# Patient Record
Sex: FEMALE | Race: WHITE | Employment: OTHER | URBAN - METROPOLITAN AREA
[De-identification: names, ages, dates, MRNs, and addresses within clinical notes are randomized per-mention and may not be internally consistent; named-entity substitution may affect disease eponyms.]

---

## 2017-01-03 ENCOUNTER — LAB ENCOUNTER (OUTPATIENT)
Dept: LAB | Age: 72
End: 2017-01-03
Attending: INTERNAL MEDICINE

## 2017-01-03 DIAGNOSIS — N17.9 AKI (ACUTE KIDNEY INJURY) (HCC): ICD-10-CM

## 2017-01-03 DIAGNOSIS — E83.52 HYPERCALCEMIA: ICD-10-CM

## 2017-01-03 LAB
25-HYDROXYVITAMIN D (TOTAL): 29.5 NG/ML (ref 30–100)
BASOPHILS # BLD AUTO: 0.03 X10(3) UL (ref 0–0.1)
BASOPHILS NFR BLD AUTO: 0.4 %
BILIRUB UR QL STRIP.AUTO: NEGATIVE
BUN BLD-MCNC: 27 MG/DL (ref 8–20)
CALCIUM BLD-MCNC: 9.1 MG/DL (ref 8.3–10.3)
CHLORIDE: 103 MMOL/L (ref 101–111)
CLARITY UR REFRACT.AUTO: CLEAR
CO2: 28 MMOL/L (ref 22–32)
COLOR UR AUTO: COLORLESS
CREAT BLD-MCNC: 1.18 MG/DL (ref 0.55–1.02)
EOSINOPHIL # BLD AUTO: 0.18 X10(3) UL (ref 0–0.3)
EOSINOPHIL NFR BLD AUTO: 2.4 %
ERYTHROCYTE [DISTWIDTH] IN BLOOD BY AUTOMATED COUNT: 14.4 % (ref 11.5–16)
GLUCOSE BLD-MCNC: 109 MG/DL (ref 70–99)
GLUCOSE UR STRIP.AUTO-MCNC: NEGATIVE MG/DL
HAV IGM SER QL: 2.2 MG/DL (ref 1.7–3)
HCT VFR BLD AUTO: 44.1 % (ref 34–50)
HGB BLD-MCNC: 14 G/DL (ref 12–16)
IMMATURE GRANULOCYTE COUNT: 0.03 X10(3) UL (ref 0–1)
IMMATURE GRANULOCYTE RATIO %: 0.4 %
KETONES UR STRIP.AUTO-MCNC: NEGATIVE MG/DL
LEUKOCYTE ESTERASE UR QL STRIP.AUTO: NEGATIVE
LYMPHOCYTES # BLD AUTO: 2.01 X10(3) UL (ref 0.9–4)
LYMPHOCYTES NFR BLD AUTO: 26.8 %
MCH RBC QN AUTO: 28.4 PG (ref 27–33.2)
MCHC RBC AUTO-ENTMCNC: 31.7 G/DL (ref 31–37)
MCV RBC AUTO: 89.5 FL (ref 81–100)
MONOCYTES # BLD AUTO: 0.72 X10(3) UL (ref 0.1–0.6)
MONOCYTES NFR BLD AUTO: 9.6 %
NEUTROPHIL ABS PRELIM: 4.53 X10 (3) UL (ref 1.3–6.7)
NEUTROPHILS # BLD AUTO: 4.53 X10(3) UL (ref 1.3–6.7)
NEUTROPHILS NFR BLD AUTO: 60.4 %
NITRITE UR QL STRIP.AUTO: NEGATIVE
PH UR STRIP.AUTO: 7 [PH] (ref 4.5–8)
PHOSPHATE SERPL-MCNC: 3.3 MG/DL (ref 2.5–4.9)
PLATELET # BLD AUTO: 168 10(3)UL (ref 150–450)
POTASSIUM SERPL-SCNC: 4 MMOL/L (ref 3.6–5.1)
PROT UR STRIP.AUTO-MCNC: NEGATIVE MG/DL
RBC # BLD AUTO: 4.93 X10(6)UL (ref 3.8–5.1)
RED CELL DISTRIBUTION WIDTH-SD: 47.4 FL (ref 35.1–46.3)
SODIUM SERPL-SCNC: 138 MMOL/L (ref 136–144)
SP GR UR STRIP.AUTO: <1.005 (ref 1–1.03)
UROBILINOGEN UR STRIP.AUTO-MCNC: <2 MG/DL
WBC # BLD AUTO: 7.5 X10(3) UL (ref 4–13)

## 2017-01-03 PROCEDURE — 82306 VITAMIN D 25 HYDROXY: CPT

## 2017-01-03 PROCEDURE — 83735 ASSAY OF MAGNESIUM: CPT

## 2017-01-03 PROCEDURE — 36415 COLL VENOUS BLD VENIPUNCTURE: CPT

## 2017-01-03 PROCEDURE — 84100 ASSAY OF PHOSPHORUS: CPT

## 2017-01-03 PROCEDURE — 81001 URINALYSIS AUTO W/SCOPE: CPT

## 2017-01-03 PROCEDURE — 80048 BASIC METABOLIC PNL TOTAL CA: CPT

## 2017-01-03 PROCEDURE — 85025 COMPLETE CBC W/AUTO DIFF WBC: CPT

## 2017-01-10 ENCOUNTER — OFFICE VISIT (OUTPATIENT)
Dept: NEPHROLOGY | Facility: CLINIC | Age: 72
End: 2017-01-10

## 2017-01-10 VITALS
RESPIRATION RATE: 16 BRPM | DIASTOLIC BLOOD PRESSURE: 78 MMHG | WEIGHT: 162 LBS | HEART RATE: 70 BPM | BODY MASS INDEX: 26 KG/M2 | SYSTOLIC BLOOD PRESSURE: 126 MMHG

## 2017-01-10 DIAGNOSIS — N18.30 CKD (CHRONIC KIDNEY DISEASE) STAGE 3, GFR 30-59 ML/MIN (HCC): ICD-10-CM

## 2017-01-10 DIAGNOSIS — N20.0 KIDNEY STONE: Primary | ICD-10-CM

## 2017-01-10 PROCEDURE — 99214 OFFICE O/P EST MOD 30 MIN: CPT | Performed by: INTERNAL MEDICINE

## 2017-01-10 RX ORDER — FUROSEMIDE 20 MG/1
20 TABLET ORAL 2 TIMES DAILY
Qty: 60 TABLET | Refills: 5 | Status: SHIPPED | OUTPATIENT
Start: 2017-01-10 | End: 2017-07-11

## 2017-01-10 NOTE — PROGRESS NOTES
Nephrology Progress Note      Bala Negro is a 80year old female. HPI:   Patient presents with:  Kidney Problem  Stones    80year old female with hx of breast ca, nephrolithasis, CKD - stage III here for follow up.   Patient's kidney stone was Rfl:    Cranberry 1000 MG Oral Cap Take 1 capsule by mouth daily.    Disp:  Rfl:        Allergies:    Sulfa Antibiotics       Hives  Codeine                 Nausea and vomiting  Seasonal                      ROS:      Denies fever/chills  Denies wt loss/gai Latest Ref Range: 4.0-13.0 x10(3) uL 7.5   Hemoglobin Latest Ref Range: 12.0-16.0 g/dL 14.0   Hematocrit Latest Ref Range: 34.0-50.0 % 44.1   Platelet Count Latest Ref Range: 150.0-450.0 10(3)uL 168.0   RBC Latest Ref Range: 3.80-5.10 x10(6)uL 4.93   Mean 0-2   BACTERIA Latest Ref Range: None Seen  None Seen   SQUAM EPI CELLS UR Latest Ref Range: Small /LPF None Seen   RENAL TUBULAR EPITHELIAL CELLS Latest Ref Range: Small /LPF None Seen   TRANSITIONAL EPI CELLS Latest Ref Range: Small /LPF None Seen   DARWIN

## 2017-01-13 ENCOUNTER — APPOINTMENT (OUTPATIENT)
Dept: LAB | Age: 72
End: 2017-01-13
Attending: INTERNAL MEDICINE
Payer: MEDICARE

## 2017-01-13 DIAGNOSIS — N20.0 KIDNEY STONE: ICD-10-CM

## 2017-01-13 PROCEDURE — 83945 ASSAY OF OXALATE: CPT

## 2017-01-13 PROCEDURE — 82340 ASSAY OF CALCIUM IN URINE: CPT

## 2017-01-13 PROCEDURE — 82507 ASSAY OF CITRATE: CPT

## 2017-01-13 PROCEDURE — 84392 ASSAY OF URINE SULFATE: CPT

## 2017-01-13 PROCEDURE — 82436 ASSAY OF URINE CHLORIDE: CPT

## 2017-01-19 ENCOUNTER — TELEPHONE (OUTPATIENT)
Dept: NEPHROLOGY | Facility: CLINIC | Age: 72
End: 2017-01-19

## 2017-01-19 LAB
CALCIUM URINE - PER 24H: 125 MG/D
CALCIUM URINE - PER VOL: 9.6 MG/DL
CHLORIDE URINE - PER 24H: 100 MMOL/D
CHLORIDE URINE - PER VOL.: 77 MMOL/L
CITRIC ACID, URINE - MG/DAY: 82 MG/D
CITRIC ACID, URINE - MG/L: 63 MG/L
CREATININE, URINE - PER 24H: 702 MG/D
CREATININE, URINE - PER VOLUME: 54 MG/DL
HOURS COLLECTED: 24 HR
MAGNESIUM URINE - PER 24H: 130 MG/D
MAGNESIUM URINE - PER VOL: 10 MG/DL
OXALATE, URINE - MG/DAY: 27 MG/D
OXALATE, URINE - MG/L: 21 MG/L
PH, URINE: 6.73
PHOSPHORUS URINE - PER 24H: 494 MG/D
PHOSPHORUS URINE - PER VOL: 38 MG/DL
POTASSIUM URINE - PER 24H: 43 MMOL/D
POTASSIUM URINE - PER VOL.: 33 MMOL/L
SODIUM URINE - PER VOL.: 98 MMOL/L
SODIUM URINE -PER 24H: 127 MMOL/D
SULFATE URINE - PER 24H: 9 MMOL/D
SULFATE URINE - PER VOL: 7 MMOL/L
TOTAL VOLUME: 1300 ML
URIC ACID URINE - PER 24H: 242 MG/D
URIC ACID URINE - PER VOL: 18.6 MG/DL
URINE SUPERSATURATION, CAHPO4: 3.26
URINE SUPERSATURATION, CAOX: 5.12
URINE SUPERSATURATION, UA CALC: 0.06

## 2017-02-17 ENCOUNTER — OFFICE VISIT (OUTPATIENT)
Dept: HEMATOLOGY/ONCOLOGY | Age: 72
End: 2017-02-17
Attending: INTERNAL MEDICINE
Payer: MEDICARE

## 2017-02-17 VITALS
HEIGHT: 65 IN | DIASTOLIC BLOOD PRESSURE: 83 MMHG | SYSTOLIC BLOOD PRESSURE: 138 MMHG | RESPIRATION RATE: 20 BRPM | WEIGHT: 169 LBS | OXYGEN SATURATION: 100 % | HEART RATE: 101 BPM | TEMPERATURE: 98 F | BODY MASS INDEX: 28.16 KG/M2

## 2017-02-17 DIAGNOSIS — C50.812 CANCER OF OVERLAPPING SITES OF LEFT FEMALE BREAST (HCC): Primary | ICD-10-CM

## 2017-02-17 PROCEDURE — 99213 OFFICE O/P EST LOW 20 MIN: CPT | Performed by: INTERNAL MEDICINE

## 2017-02-17 RX ORDER — OMEPRAZOLE 40 MG/1
20 CAPSULE, DELAYED RELEASE ORAL
COMMUNITY
End: 2017-07-11

## 2017-02-17 NOTE — PROGRESS NOTES
Cancer Center Progress Note    Problem List:      Patient Active Problem List:     Breast cancer, right breast ([facility] [address]     Colon polyps     DVT     Unspecified asthma(493.90)     Vitamin D deficiency     Osteoarthrosis, unspecified whether generalized or loc Take 1 tablet (10 mEq total) by mouth 2 (two) times daily. Disp: 60 tablet Rfl: 6   furosemide 20 MG Oral Tab Take 1 tablet (20 mg total) by mouth 2 (two) times daily. Disp: 60 tablet Rfl: 5   aspirin 81 MG Oral Tab EC Take 81 mg by mouth daily.  Disp:  Rfl 01/03/2017   CO2 28.0 01/03/2017   BUN 27* 01/03/2017   CREATSERUM 1.18* 01/03/2017   * 01/03/2017   CA 9.1 01/03/2017   ALKPHO 56 06/03/2016   ALT 16 06/03/2016   AST 15 06/03/2016   BILT 0.4 06/03/2016   ALB 2.7* 06/03/2016   TP 5.5* 06/03/2016

## 2017-05-04 ENCOUNTER — HOSPITAL ENCOUNTER (OUTPATIENT)
Dept: MAMMOGRAPHY | Age: 72
Discharge: HOME | End: 2017-05-04
Attending: SURGERY
Payer: MEDICARE

## 2017-05-04 DIAGNOSIS — C50.911 MALIGNANT NEOPLASM OF RIGHT BREAST (HCC): ICD-10-CM

## 2017-05-04 PROCEDURE — 77062 BREAST TOMOSYNTHESIS BI: CPT

## 2017-05-04 PROCEDURE — 77066 DX MAMMO INCL CAD BI: CPT

## 2017-05-24 ENCOUNTER — APPOINTMENT (OUTPATIENT)
Dept: LAB | Age: 72
End: 2017-05-24
Attending: SURGERY
Payer: MEDICARE

## 2017-05-24 DIAGNOSIS — K43.9 VENTRAL HERNIA WITHOUT OBSTRUCTION OR GANGRENE: ICD-10-CM

## 2017-05-24 PROCEDURE — 82565 ASSAY OF CREATININE: CPT

## 2017-05-24 PROCEDURE — 84520 ASSAY OF UREA NITROGEN: CPT

## 2017-05-24 PROCEDURE — 36415 COLL VENOUS BLD VENIPUNCTURE: CPT

## 2017-05-25 ENCOUNTER — HOSPITAL ENCOUNTER (OUTPATIENT)
Dept: CT IMAGING | Age: 72
Discharge: HOME | End: 2017-05-25
Attending: SURGERY
Payer: MEDICARE

## 2017-05-25 DIAGNOSIS — K43.9 VENTRAL HERNIA WITHOUT OBSTRUCTION OR GANGRENE: ICD-10-CM

## 2017-05-25 PROCEDURE — 74177 CT ABD & PELVIS W/CONTRAST: CPT | Performed by: SURGERY

## 2017-07-06 ENCOUNTER — LAB ENCOUNTER (OUTPATIENT)
Dept: LAB | Age: 72
End: 2017-07-06
Attending: INTERNAL MEDICINE
Payer: MEDICARE

## 2017-07-06 DIAGNOSIS — N18.30 CKD (CHRONIC KIDNEY DISEASE) STAGE 3, GFR 30-59 ML/MIN (HCC): ICD-10-CM

## 2017-07-06 LAB
25-HYDROXYVITAMIN D (TOTAL): 24.4 NG/ML (ref 30–100)
BASOPHILS # BLD AUTO: 0.04 X10(3) UL (ref 0–0.1)
BASOPHILS NFR BLD AUTO: 0.6 %
BUN BLD-MCNC: 29 MG/DL (ref 8–20)
CALCIUM BLD-MCNC: 8.9 MG/DL (ref 8.3–10.3)
CHLORIDE: 109 MMOL/L (ref 101–111)
CO2: 29 MMOL/L (ref 22–32)
CREAT BLD-MCNC: 1.21 MG/DL (ref 0.55–1.02)
EOSINOPHIL # BLD AUTO: 0.3 X10(3) UL (ref 0–0.3)
EOSINOPHIL NFR BLD AUTO: 4.4 %
ERYTHROCYTE [DISTWIDTH] IN BLOOD BY AUTOMATED COUNT: 14.5 % (ref 11.5–16)
GLUCOSE BLD-MCNC: 67 MG/DL (ref 70–99)
HAV IGM SER QL: 2 MG/DL (ref 1.7–3)
HCT VFR BLD AUTO: 42.5 % (ref 34–50)
HGB BLD-MCNC: 13.5 G/DL (ref 12–16)
IMMATURE GRANULOCYTE COUNT: 0.02 X10(3) UL (ref 0–1)
IMMATURE GRANULOCYTE RATIO %: 0.3 %
LYMPHOCYTES # BLD AUTO: 1.91 X10(3) UL (ref 0.9–4)
LYMPHOCYTES NFR BLD AUTO: 27.9 %
MCH RBC QN AUTO: 27.8 PG (ref 27–33.2)
MCHC RBC AUTO-ENTMCNC: 31.8 G/DL (ref 31–37)
MCV RBC AUTO: 87.4 FL (ref 81–100)
MONOCYTES # BLD AUTO: 0.78 X10(3) UL (ref 0.1–0.6)
MONOCYTES NFR BLD AUTO: 11.4 %
NEUTROPHIL ABS PRELIM: 3.79 X10 (3) UL (ref 1.3–6.7)
NEUTROPHILS # BLD AUTO: 3.79 X10(3) UL (ref 1.3–6.7)
NEUTROPHILS NFR BLD AUTO: 55.4 %
PHOSPHATE SERPL-MCNC: 3.3 MG/DL (ref 2.5–4.9)
PLATELET # BLD AUTO: 204 10(3)UL (ref 150–450)
POTASSIUM SERPL-SCNC: 4.7 MMOL/L (ref 3.6–5.1)
PTH-INTACT SERPL-MCNC: 56.2 PG/ML (ref 11.1–79.5)
RBC # BLD AUTO: 4.86 X10(6)UL (ref 3.8–5.1)
RED CELL DISTRIBUTION WIDTH-SD: 46.5 FL (ref 35.1–46.3)
SODIUM SERPL-SCNC: 142 MMOL/L (ref 136–144)
URIC ACID URINE RANDOM: 7.1 MG/DL
WBC # BLD AUTO: 6.8 X10(3) UL (ref 4–13)

## 2017-07-06 PROCEDURE — 84560 ASSAY OF URINE/URIC ACID: CPT

## 2017-07-06 PROCEDURE — 84100 ASSAY OF PHOSPHORUS: CPT

## 2017-07-06 PROCEDURE — 80048 BASIC METABOLIC PNL TOTAL CA: CPT

## 2017-07-06 PROCEDURE — 36415 COLL VENOUS BLD VENIPUNCTURE: CPT

## 2017-07-06 PROCEDURE — 83970 ASSAY OF PARATHORMONE: CPT

## 2017-07-06 PROCEDURE — 83735 ASSAY OF MAGNESIUM: CPT

## 2017-07-06 PROCEDURE — 82306 VITAMIN D 25 HYDROXY: CPT

## 2017-07-06 PROCEDURE — 85025 COMPLETE CBC W/AUTO DIFF WBC: CPT

## 2017-07-11 ENCOUNTER — OFFICE VISIT (OUTPATIENT)
Dept: NEPHROLOGY | Facility: CLINIC | Age: 72
End: 2017-07-11

## 2017-07-11 VITALS
WEIGHT: 177 LBS | SYSTOLIC BLOOD PRESSURE: 132 MMHG | HEART RATE: 80 BPM | RESPIRATION RATE: 16 BRPM | DIASTOLIC BLOOD PRESSURE: 72 MMHG | BODY MASS INDEX: 29 KG/M2

## 2017-07-11 DIAGNOSIS — N18.30 CKD (CHRONIC KIDNEY DISEASE) STAGE 3, GFR 30-59 ML/MIN (HCC): Primary | ICD-10-CM

## 2017-07-11 PROCEDURE — 99214 OFFICE O/P EST MOD 30 MIN: CPT | Performed by: INTERNAL MEDICINE

## 2017-07-11 NOTE — PROGRESS NOTES
Nephrology Progress Note      Melissa Hall is a 80year old female. HPI:   Patient presents with:  Chronic Kidney Disease  Stones    80year old female with hx of breast ca, nephrolithasis, CKD - stage III here for follow up.   Patient's kidney s normal, no murmur or rub  Lungs: Clear without wheezes, rales, rhonchi.     Abdomen: Soft, non-tender. + bowel sounds, no palpable organomegaly  Extremities: Without clubbing, cyanosis; + BLE edema   Neurologic: Alert and oriented, normal affect, cranial ne uL 0.30   Basophils Absolute Latest Ref Range: 0.00 - 0.10 x10(3) uL 0.04   Immature Granulocyte Absolute Latest Ref Range: 0.00 - 1.00 x10(3) uL 0.02   Neutrophils % Latest Units: % 55.4   Lymphocytes % Latest Units: % 27.9   Monocytes % Latest Units: % 1

## 2017-07-12 ENCOUNTER — ANESTHESIA EVENT (OUTPATIENT)
Dept: SURGERY | Facility: HOSPITAL | Age: 72
DRG: 354 | End: 2017-07-12
Payer: MEDICARE

## 2017-07-24 ENCOUNTER — SURGERY (OUTPATIENT)
Age: 72
End: 2017-07-24

## 2017-07-24 ENCOUNTER — HOSPITAL ENCOUNTER (INPATIENT)
Facility: HOSPITAL | Age: 72
LOS: 2 days | Discharge: HOME | DRG: 354 | End: 2017-07-26
Attending: SURGERY | Admitting: SURGERY
Payer: MEDICARE

## 2017-07-24 ENCOUNTER — ANESTHESIA (OUTPATIENT)
Dept: SURGERY | Facility: HOSPITAL | Age: 72
DRG: 354 | End: 2017-07-24
Payer: MEDICARE

## 2017-07-24 DIAGNOSIS — K43.9 ABDOMINAL WALL HERNIA: Primary | ICD-10-CM

## 2017-07-24 PROCEDURE — 0WUF0JZ SUPPLEMENT ABDOMINAL WALL WITH SYNTHETIC SUBSTITUTE, OPEN APPROACH: ICD-10-PCS | Performed by: SURGERY

## 2017-07-24 DEVICE — BARD SOFT MESH
Type: IMPLANTABLE DEVICE | Site: ABDOMEN | Status: FUNCTIONAL
Brand: BARD SOFT MESH

## 2017-07-24 DEVICE — VENTRIO ST HERNIA PATCH
Type: IMPLANTABLE DEVICE | Site: ABDOMEN | Status: FUNCTIONAL
Brand: VENTRIO ST HERNIA PATCH

## 2017-07-24 RX ORDER — HYDROCODONE BITARTRATE AND ACETAMINOPHEN 5; 325 MG/1; MG/1
1 TABLET ORAL AS NEEDED
Status: DISCONTINUED | OUTPATIENT
Start: 2017-07-24 | End: 2017-07-24

## 2017-07-24 RX ORDER — HYDRALAZINE HYDROCHLORIDE 20 MG/ML
5 INJECTION INTRAMUSCULAR; INTRAVENOUS
Status: DISCONTINUED | OUTPATIENT
Start: 2017-07-24 | End: 2017-07-26

## 2017-07-24 RX ORDER — HYDROMORPHONE HYDROCHLORIDE 1 MG/ML
0.4 INJECTION, SOLUTION INTRAMUSCULAR; INTRAVENOUS; SUBCUTANEOUS EVERY 5 MIN PRN
Status: DISCONTINUED | OUTPATIENT
Start: 2017-07-24 | End: 2017-07-24 | Stop reason: HOSPADM

## 2017-07-24 RX ORDER — NALOXONE HYDROCHLORIDE 0.4 MG/ML
80 INJECTION, SOLUTION INTRAMUSCULAR; INTRAVENOUS; SUBCUTANEOUS AS NEEDED
Status: DISCONTINUED | OUTPATIENT
Start: 2017-07-24 | End: 2017-07-24 | Stop reason: HOSPADM

## 2017-07-24 RX ORDER — HYDROCODONE BITARTRATE AND ACETAMINOPHEN 5; 325 MG/1; MG/1
2 TABLET ORAL AS NEEDED
Status: DISCONTINUED | OUTPATIENT
Start: 2017-07-24 | End: 2017-07-24

## 2017-07-24 RX ORDER — LIDOCAINE HYDROCHLORIDE AND EPINEPHRINE 10; 10 MG/ML; UG/ML
INJECTION, SOLUTION INFILTRATION; PERINEURAL AS NEEDED
Status: DISCONTINUED | OUTPATIENT
Start: 2017-07-24 | End: 2017-07-24 | Stop reason: HOSPADM

## 2017-07-24 RX ORDER — DOCUSATE SODIUM 100 MG/1
100 CAPSULE, LIQUID FILLED ORAL 2 TIMES DAILY
Status: DISCONTINUED | OUTPATIENT
Start: 2017-07-24 | End: 2017-07-26

## 2017-07-24 RX ORDER — HEPARIN SODIUM 5000 [USP'U]/ML
5000 INJECTION, SOLUTION INTRAVENOUS; SUBCUTANEOUS EVERY 12 HOURS SCHEDULED
Status: DISCONTINUED | OUTPATIENT
Start: 2017-07-24 | End: 2017-07-26

## 2017-07-24 RX ORDER — ONDANSETRON 2 MG/ML
4 INJECTION INTRAMUSCULAR; INTRAVENOUS EVERY 6 HOURS PRN
Status: DISCONTINUED | OUTPATIENT
Start: 2017-07-24 | End: 2017-07-26

## 2017-07-24 RX ORDER — FUROSEMIDE 20 MG/1
20 TABLET ORAL 2 TIMES DAILY
COMMUNITY
End: 2018-01-31

## 2017-07-24 RX ORDER — HYDROMORPHONE HYDROCHLORIDE 1 MG/ML
0.8 INJECTION, SOLUTION INTRAMUSCULAR; INTRAVENOUS; SUBCUTANEOUS EVERY 2 HOUR PRN
Status: DISCONTINUED | OUTPATIENT
Start: 2017-07-24 | End: 2017-07-26

## 2017-07-24 RX ORDER — BISACODYL 10 MG
10 SUPPOSITORY, RECTAL RECTAL
Status: DISCONTINUED | OUTPATIENT
Start: 2017-07-24 | End: 2017-07-26

## 2017-07-24 RX ORDER — ACETAMINOPHEN 500 MG
1000 TABLET ORAL ONCE AS NEEDED
Status: DISCONTINUED | OUTPATIENT
Start: 2017-07-24 | End: 2017-07-24

## 2017-07-24 RX ORDER — SODIUM CHLORIDE, SODIUM LACTATE, POTASSIUM CHLORIDE, CALCIUM CHLORIDE 600; 310; 30; 20 MG/100ML; MG/100ML; MG/100ML; MG/100ML
INJECTION, SOLUTION INTRAVENOUS CONTINUOUS
Status: DISCONTINUED | OUTPATIENT
Start: 2017-07-24 | End: 2017-07-26

## 2017-07-24 RX ORDER — ONDANSETRON 2 MG/ML
INJECTION INTRAMUSCULAR; INTRAVENOUS
Status: COMPLETED
Start: 2017-07-24 | End: 2017-07-24

## 2017-07-24 RX ORDER — HYDROCODONE BITARTRATE AND ACETAMINOPHEN 5; 325 MG/1; MG/1
2 TABLET ORAL EVERY 4 HOURS PRN
Status: DISCONTINUED | OUTPATIENT
Start: 2017-07-24 | End: 2017-07-26

## 2017-07-24 RX ORDER — HYDROCODONE BITARTRATE AND ACETAMINOPHEN 5; 325 MG/1; MG/1
1 TABLET ORAL EVERY 4 HOURS PRN
Status: DISCONTINUED | OUTPATIENT
Start: 2017-07-24 | End: 2017-07-26

## 2017-07-24 RX ORDER — BACITRACIN 50000 [USP'U]/1
INJECTION, POWDER, LYOPHILIZED, FOR SOLUTION INTRAMUSCULAR AS NEEDED
Status: DISCONTINUED | OUTPATIENT
Start: 2017-07-24 | End: 2017-07-24 | Stop reason: HOSPADM

## 2017-07-24 RX ORDER — TRAMADOL HYDROCHLORIDE 50 MG/1
50 TABLET ORAL EVERY 6 HOURS PRN
Status: DISCONTINUED | OUTPATIENT
Start: 2017-07-24 | End: 2017-07-26

## 2017-07-24 RX ORDER — HYDROMORPHONE HYDROCHLORIDE 1 MG/ML
INJECTION, SOLUTION INTRAMUSCULAR; INTRAVENOUS; SUBCUTANEOUS
Status: COMPLETED
Start: 2017-07-24 | End: 2017-07-24

## 2017-07-24 RX ORDER — ACETAMINOPHEN 10 MG/ML
1000 INJECTION, SOLUTION INTRAVENOUS EVERY 6 HOURS
Status: DISPENSED | OUTPATIENT
Start: 2017-07-24 | End: 2017-07-25

## 2017-07-24 RX ORDER — HYDROMORPHONE HYDROCHLORIDE 1 MG/ML
0.4 INJECTION, SOLUTION INTRAMUSCULAR; INTRAVENOUS; SUBCUTANEOUS EVERY 2 HOUR PRN
Status: DISCONTINUED | OUTPATIENT
Start: 2017-07-24 | End: 2017-07-26

## 2017-07-24 RX ORDER — MIDAZOLAM HYDROCHLORIDE 1 MG/ML
1 INJECTION INTRAMUSCULAR; INTRAVENOUS EVERY 5 MIN PRN
Status: DISCONTINUED | OUTPATIENT
Start: 2017-07-24 | End: 2017-07-24 | Stop reason: HOSPADM

## 2017-07-24 RX ORDER — DIPHENHYDRAMINE HYDROCHLORIDE 50 MG/ML
12.5 INJECTION INTRAMUSCULAR; INTRAVENOUS AS NEEDED
Status: DISCONTINUED | OUTPATIENT
Start: 2017-07-24 | End: 2017-07-24 | Stop reason: HOSPADM

## 2017-07-24 RX ORDER — HYDROMORPHONE HYDROCHLORIDE 1 MG/ML
1.2 INJECTION, SOLUTION INTRAMUSCULAR; INTRAVENOUS; SUBCUTANEOUS EVERY 2 HOUR PRN
Status: DISCONTINUED | OUTPATIENT
Start: 2017-07-24 | End: 2017-07-26

## 2017-07-24 RX ORDER — ZOLPIDEM TARTRATE 5 MG/1
5 TABLET ORAL NIGHTLY PRN
Status: DISCONTINUED | OUTPATIENT
Start: 2017-07-24 | End: 2017-07-26

## 2017-07-24 RX ORDER — ONDANSETRON 2 MG/ML
4 INJECTION INTRAMUSCULAR; INTRAVENOUS AS NEEDED
Status: DISCONTINUED | OUTPATIENT
Start: 2017-07-24 | End: 2017-07-24 | Stop reason: HOSPADM

## 2017-07-24 RX ORDER — SODIUM PHOSPHATE, DIBASIC AND SODIUM PHOSPHATE, MONOBASIC 7; 19 G/133ML; G/133ML
1 ENEMA RECTAL ONCE AS NEEDED
Status: DISCONTINUED | OUTPATIENT
Start: 2017-07-24 | End: 2017-07-26

## 2017-07-24 RX ORDER — DEXTROSE, SODIUM CHLORIDE, SODIUM LACTATE, POTASSIUM CHLORIDE, AND CALCIUM CHLORIDE 5; .6; .31; .03; .02 G/100ML; G/100ML; G/100ML; G/100ML; G/100ML
INJECTION, SOLUTION INTRAVENOUS CONTINUOUS
Status: DISCONTINUED | OUTPATIENT
Start: 2017-07-24 | End: 2017-07-26

## 2017-07-24 RX ORDER — POLYETHYLENE GLYCOL 3350 17 G/17G
17 POWDER, FOR SOLUTION ORAL DAILY PRN
Status: DISCONTINUED | OUTPATIENT
Start: 2017-07-24 | End: 2017-07-26

## 2017-07-24 RX ORDER — MEPERIDINE HYDROCHLORIDE 25 MG/ML
INJECTION INTRAMUSCULAR; INTRAVENOUS; SUBCUTANEOUS
Status: COMPLETED
Start: 2017-07-24 | End: 2017-07-24

## 2017-07-24 RX ORDER — MEPERIDINE HYDROCHLORIDE 25 MG/ML
12.5 INJECTION INTRAMUSCULAR; INTRAVENOUS; SUBCUTANEOUS AS NEEDED
Status: COMPLETED | OUTPATIENT
Start: 2017-07-24 | End: 2017-07-24

## 2017-07-24 RX ORDER — POTASSIUM CITRATE 5 MEQ/1
10 TABLET, EXTENDED RELEASE ORAL 2 TIMES DAILY
Status: DISCONTINUED | OUTPATIENT
Start: 2017-07-24 | End: 2017-07-26

## 2017-07-24 RX ORDER — BUPIVACAINE HYDROCHLORIDE 5 MG/ML
INJECTION, SOLUTION EPIDURAL; INTRACAUDAL AS NEEDED
Status: DISCONTINUED | OUTPATIENT
Start: 2017-07-24 | End: 2017-07-24 | Stop reason: HOSPADM

## 2017-07-24 RX ORDER — KETOROLAC TROMETHAMINE 30 MG/ML
30 INJECTION, SOLUTION INTRAMUSCULAR; INTRAVENOUS EVERY 6 HOURS PRN
Status: DISCONTINUED | OUTPATIENT
Start: 2017-07-24 | End: 2017-07-26

## 2017-07-24 NOTE — ANESTHESIA POSTPROCEDURE EVALUATION
[address] Patient Status:  Surgery Admit   Age/Gender 80year old female MRN [id number]   [facility] SURGERY Attending Reymundo Barr, [facility] Se Day # 0 PCP Marialuisa Riggs MD       Anesthesia Post-op Note    Procedu

## 2017-07-24 NOTE — PLAN OF CARE
Verbalizes/displays adequate comfort level or patient's stated pain goal Progressing      Incision/wound heals without complications Progressing      Pt received on the unit from PACU. Pt is alert and orient x3.  Abdomen is soft, non-distended, incision not

## 2017-07-24 NOTE — CONSULTS
[facility] Hospitalist Initial Consult       Reason for consult: Medical Management sp repair of complex abdominal wall hernia with mesh      History of Present Illness: Patient is a 80year old female with PMH sig for breast ca, CKD III, nephrolithaisis who pres Past Surgical History:  12/1/2006: COLONOSCOPY      Comment: POLYPS, RPT 5 YRS  09/17/13: COLONOSCOPY      Comment: Porsha Fernández (rpt 3 yrs)  4/26/2016: COLONOSCOPY N/A      Comment: Procedure: COLONOSCOPY;  Surgeon: Anand Sequeira MD;  Location Other [OTHER] Sister      lupus       Review of Systems  All 10 systems otherwise reviewed and negative unless otherwise stated in the HPI.         OBJECTIVE:  BP (!) 139/118   Pulse 78   Temp 97.8 °F (36.6 °C) (Temporal)   Resp 20   Ht 5' 5\" (1.651 m)   W anemia  -Resume ASA when ok with surgery    CKD-3  -follows with Dr. Billy Reynolds as outpatient  -Had been instructed to resume lasix as outpatient for LE edema  -As recently started on clears and on IVF hold diuretics for now, resume closer to discharge    Elev

## 2017-07-24 NOTE — H&P
History & Physical Examination    Patient Name: Shane Hernandes  MRN: [id number]  CSN: [id number]  YOB: 1945    Diagnosis: complex abd wall hernia          Prescriptions Prior to Admission:  furosemide 20 MG Oral Tab Take 20 mg by mouth 2 Comment: Gillian Watts (rpt 3 yrs)  4/26/2016: COLONOSCOPY N/A      Comment: Procedure: COLONOSCOPY;  Surgeon: Zena Orr MD;  Location: [address] [place] [address] ENDOSCOPY  No date: CYST ASPIRATION LEFT  No date: CYST ASPIRATION RIGHT  12/12/15: Ronnie Bryan please explain:   HEENT [ x] x    NECK & BACK x x    HEART x x    LUNGS x x    ABDOMEN [ ] [ ] Large hernia at old colostomy site   UROGENITAL [ ] [ ]    EXTREMITIES [x ] [x ]    OTHER        [ x ] I have discussed the risks and benefits and alternatives w

## 2017-07-24 NOTE — BRIEF OP NOTE
Pre-Operative Diagnosis: LARGE ABDOMINAL WALL HERNIA     Post-Operative Diagnosis: LARGE ABDOMINAL WALL HERNIA     Procedure Performed:   Procedure(s):  REPAIR COMPLEX ABDOMINAL WALL HERNIA WITH MESH    Surgeon(s) and Role:     Sumaya Mathis MD - Pr

## 2017-07-24 NOTE — ANESTHESIA PREPROCEDURE EVALUATION
PRE-OP EVALUATION    Patient Name: Elidia Kelly    Pre-op Diagnosis: [address]    Procedure(s):  REPAIR COMPLEX ABDOMINAL WALL HERNIA WITH MESH    Surgeon(s) and Role:     Cleveland Pratt MD - Primary    Pre-op vitals reviewed. COLONOSCOPY      Comment: Jarocho Lay (rpt 3 yrs)  4/26/2016: COLONOSCOPY N/A      Comment: Procedure: COLONOSCOPY;  Surgeon: Diana Chávez MD;  Location: [address] [place] [address] ENDOSCOPY  No date: CYST ASPIRATION LEFT  No date: CYST ASPIRATION RIGHT  12/12/15: Catherine Cole 07/06/2017   GLU 67 (L) 07/06/2017   CA 8.9 07/06/2017            Airway      Mallampati: II  Mouth opening: 3 FB  TM distance: > 6 cm  Neck ROM: full Cardiovascular    Cardiovascular exam normal.         Dental    No notable dental history.          Pulmon

## 2017-07-25 LAB
BASOPHILS # BLD AUTO: 0.01 X10(3) UL (ref 0–0.1)
BASOPHILS NFR BLD AUTO: 0.1 %
BUN BLD-MCNC: 22 MG/DL (ref 8–20)
CALCIUM BLD-MCNC: 8.4 MG/DL (ref 8.3–10.3)
CHLORIDE: 109 MMOL/L (ref 101–111)
CO2: 26 MMOL/L (ref 22–32)
CREAT BLD-MCNC: 1.19 MG/DL (ref 0.55–1.02)
EOSINOPHIL # BLD AUTO: 0 X10(3) UL (ref 0–0.3)
EOSINOPHIL NFR BLD AUTO: 0 %
ERYTHROCYTE [DISTWIDTH] IN BLOOD BY AUTOMATED COUNT: 14.3 % (ref 11.5–16)
GLUCOSE BLD-MCNC: 120 MG/DL (ref 70–99)
HCT VFR BLD AUTO: 35.5 % (ref 34–50)
HGB BLD-MCNC: 11.4 G/DL (ref 12–16)
IMMATURE GRANULOCYTE COUNT: 0.05 X10(3) UL (ref 0–1)
IMMATURE GRANULOCYTE RATIO %: 0.4 %
LYMPHOCYTES # BLD AUTO: 1.22 X10(3) UL (ref 0.9–4)
LYMPHOCYTES NFR BLD AUTO: 10.6 %
MCH RBC QN AUTO: 28 PG (ref 27–33.2)
MCHC RBC AUTO-ENTMCNC: 32.1 G/DL (ref 31–37)
MCV RBC AUTO: 87.2 FL (ref 81–100)
MONOCYTES # BLD AUTO: 1.24 X10(3) UL (ref 0.1–0.6)
MONOCYTES NFR BLD AUTO: 10.8 %
NEUTROPHIL ABS PRELIM: 9 X10 (3) UL (ref 1.3–6.7)
NEUTROPHILS # BLD AUTO: 9 X10(3) UL (ref 1.3–6.7)
NEUTROPHILS NFR BLD AUTO: 78.1 %
PLATELET # BLD AUTO: 160 10(3)UL (ref 150–450)
POTASSIUM SERPL-SCNC: 4.6 MMOL/L (ref 3.6–5.1)
RBC # BLD AUTO: 4.07 X10(6)UL (ref 3.8–5.1)
RED CELL DISTRIBUTION WIDTH-SD: 44.8 FL (ref 35.1–46.3)
SODIUM SERPL-SCNC: 143 MMOL/L (ref 136–144)
WBC # BLD AUTO: 11.5 X10(3) UL (ref 4–13)

## 2017-07-25 PROCEDURE — 80048 BASIC METABOLIC PNL TOTAL CA: CPT | Performed by: HOSPITALIST

## 2017-07-25 PROCEDURE — 85025 COMPLETE CBC W/AUTO DIFF WBC: CPT | Performed by: HOSPITALIST

## 2017-07-25 RX ORDER — IBUPROFEN 600 MG/1
600 TABLET ORAL EVERY 6 HOURS PRN
Status: DISCONTINUED | OUTPATIENT
Start: 2017-07-25 | End: 2017-07-26

## 2017-07-25 RX ORDER — DIPHENHYDRAMINE HYDROCHLORIDE 50 MG/ML
25 INJECTION INTRAMUSCULAR; INTRAVENOUS EVERY 6 HOURS PRN
Status: DISCONTINUED | OUTPATIENT
Start: 2017-07-25 | End: 2017-07-26

## 2017-07-25 RX ORDER — CEPHALEXIN 500 MG/1
500 CAPSULE ORAL EVERY 8 HOURS
Status: DISCONTINUED | OUTPATIENT
Start: 2017-07-26 | End: 2017-07-26

## 2017-07-25 RX ORDER — IBUPROFEN 400 MG/1
400 TABLET ORAL EVERY 6 HOURS PRN
Status: DISCONTINUED | OUTPATIENT
Start: 2017-07-25 | End: 2017-07-26

## 2017-07-25 RX ORDER — DIPHENHYDRAMINE HCL 25 MG
25 CAPSULE ORAL EVERY 6 HOURS PRN
Status: DISCONTINUED | OUTPATIENT
Start: 2017-07-25 | End: 2017-07-26

## 2017-07-25 NOTE — PROGRESS NOTES
[facility] Hospitalist Progress Note                                                                   [address]  11/7/1945    CC: F/U s/p hernia surgery    SUBJECTIVE:    States pain is bisacodyl, FLEET ENEMA, ondansetron HCl, TraMADol HCl, ketorolac (TORADOL) injection, hydrALAzine HCl    Assessment/Plan:  Active Problems:    Abdominal wall hernia      sp repair of complex abdominal wall hernia with mesh  - Plan per general surgery  -Ful

## 2017-07-25 NOTE — PROGRESS NOTES
[facility]  Progress Note    Ban Mehta Patient Status:  Inpatient    1945 MRN [id number]   [facility] 3NW-A Attending Gadiel Serra, [facility] Se Day # 1 PCP Joni Licona MD     Subjective:    Patient reports IV tylenol n Group  General Surgery  7/25/2017

## 2017-07-25 NOTE — PLAN OF CARE
PAIN - ADULT    • Verbalizes/displays adequate comfort level or patient's stated pain goal Progressing        SKIN INTEGRITY    • Incision/wound heals without complications Progressing        Respirations easy, IS encouraged, given dilaudid for pain, abdom

## 2017-07-25 NOTE — CM/SW NOTE
Patient and spouse are fully independent. Deny needs/concerns for d/c at this time.      07/25/17 0800   CM/SW Referral Data   Referral Source Physician;Social Work (self-referral)   Reason for Referral Discharge planning   Informant Patient   Pertinent Med

## 2017-07-26 VITALS
DIASTOLIC BLOOD PRESSURE: 41 MMHG | BODY MASS INDEX: 27.49 KG/M2 | TEMPERATURE: 98 F | HEART RATE: 65 BPM | RESPIRATION RATE: 20 BRPM | OXYGEN SATURATION: 100 % | HEIGHT: 65 IN | WEIGHT: 165 LBS | SYSTOLIC BLOOD PRESSURE: 127 MMHG

## 2017-07-26 RX ORDER — CEFADROXIL 500 MG/1
500 CAPSULE ORAL 2 TIMES DAILY
Qty: 14 CAPSULE | Refills: 0 | Status: SHIPPED | OUTPATIENT
Start: 2017-07-26 | End: 2017-08-02

## 2017-07-26 NOTE — PROGRESS NOTES
Patient states she feels warm and faced is red/flushed. Afebrile. Denies any itching or rash. VSS. States she feels no symptoms. Also states Toradol help her pain greatly.

## 2017-07-26 NOTE — PLAN OF CARE
PAIN - ADULT    • Verbalizes/displays adequate comfort level or patient's stated pain goal Progressing        SKIN INTEGRITY    • Incision/wound heals without complications Progressing        Resting comfortably, given motrin for pain, iv infiltrated, ok t

## 2017-07-26 NOTE — PLAN OF CARE
PAIN - ADULT    • Verbalizes/displays adequate comfort level or patient's stated pain goal Adequate for Discharge        SKIN INTEGRITY    • Incision/wound heals without complications Adequate for Discharge            Patient alert and oriented x3.  Up ad l

## 2017-07-26 NOTE — DISCHARGE SUMMARY
[facility]  Discharge Summary    Rodo Mariee Patient Status:  Inpatient    1945 MRN [id number]   [facility] 3NW-A Attending No att. providers found   Hosp Day # 2 PCP Barbara Browne MD     Date of Admission: 2017    Da mEq total) by mouth 2 (two) times daily. , Normal, Disp-60 tablet, R-6    aspirin 81 MG Oral Tab EC  Take 81 mg by mouth daily. , Historical    VALERIAN ROOT OR  Take 1 tablet by mouth nightly.  , Historical    magnesium oxide (MAG-OX) 400 MG Oral Tab  Take

## 2017-07-26 NOTE — PLAN OF CARE
PAIN - ADULT    • Verbalizes/displays adequate comfort level or patient's stated pain goal Progressing        SKIN INTEGRITY    • Incision/wound heals without complications Progressing        Patient states Toradol helped her pain more than any other medic

## 2017-07-26 NOTE — PLAN OF CARE
PAIN - ADULT    • Verbalizes/displays adequate comfort level or patient's stated pain goal Progressing        SKIN INTEGRITY    • Incision/wound heals without complications Progressing        Upon assessment, A&OX4.  Pt states she is comfortable at this indy

## 2017-07-27 NOTE — PROGRESS NOTES
[facility] Hospitalist Progress Note                                                                   [address]  11/7/1945    CC: F/U s/p hernia surgery    SUBJECTIVE:    States abd khushbu now      Hx of nephrolithiasis  -potassium citrate         Prevention: heparin subcutaneous     Dispo: ok to d/c from IM perspective    Tash Hogue MD  [facility] Hospitalist  Pager: [phone]

## 2017-08-18 ENCOUNTER — OFFICE VISIT (OUTPATIENT)
Dept: HEMATOLOGY/ONCOLOGY | Age: 72
End: 2017-08-18
Attending: INTERNAL MEDICINE
Payer: MEDICARE

## 2017-08-18 VITALS
BODY MASS INDEX: 29 KG/M2 | TEMPERATURE: 97 F | WEIGHT: 174.63 LBS | HEART RATE: 95 BPM | DIASTOLIC BLOOD PRESSURE: 71 MMHG | OXYGEN SATURATION: 97 % | RESPIRATION RATE: 18 BRPM | SYSTOLIC BLOOD PRESSURE: 134 MMHG

## 2017-08-18 DIAGNOSIS — Z17.1 MALIGNANT NEOPLASM OF OVERLAPPING SITES OF LEFT BREAST IN FEMALE, ESTROGEN RECEPTOR NEGATIVE (HCC): Primary | ICD-10-CM

## 2017-08-18 DIAGNOSIS — C50.812 MALIGNANT NEOPLASM OF OVERLAPPING SITES OF LEFT BREAST IN FEMALE, ESTROGEN RECEPTOR NEGATIVE (HCC): Primary | ICD-10-CM

## 2017-08-18 PROCEDURE — 99214 OFFICE O/P EST MOD 30 MIN: CPT | Performed by: INTERNAL MEDICINE

## 2017-08-18 NOTE — PROGRESS NOTES
Cancer Center Progress Note    Problem List:      Patient Active Problem List:     Breast cancer, right breast ([facility] [address]     Colon polyps     DVT     Unspecified asthma(493.90)     Vitamin D deficiency     Osteoarthrosis, unspecified whether generalized or loc Medications:    omeprazole 20 MG Oral Capsule Delayed Release Take 1 capsule (20 mg total) by mouth daily. Before meal Disp: 30 capsule Rfl: 0   furosemide 20 MG Oral Tab Take 20 mg by mouth 2 (two) times daily.  Disp:  Rfl:    aspirin 81 MG Oral Tab MCH 27.8 01/27/2016   MCH 28.9 07/22/2011   MCHC 32.1 07/25/2017   MCHC 32.2 01/27/2016   MCHC 32.8 07/22/2011   RDW 14.3 07/25/2017   RDW 14.4 01/27/2016   RDW 14.5 07/22/2011   .0 07/25/2017    01/27/2016    07/22/2011       Lab Re

## 2017-11-06 ENCOUNTER — HOSPITAL ENCOUNTER (OUTPATIENT)
Dept: MAMMOGRAPHY | Age: 72
Discharge: HOME | End: 2017-11-06
Attending: SURGERY
Payer: MEDICARE

## 2017-11-06 DIAGNOSIS — C50.912 MALIGNANT NEOPLASM OF LEFT FEMALE BREAST, UNSPECIFIED ESTROGEN RECEPTOR STATUS, UNSPECIFIED SITE OF BREAST (HCC): ICD-10-CM

## 2017-11-06 DIAGNOSIS — K43.9 VENTRAL HERNIA WITHOUT OBSTRUCTION OR GANGRENE: ICD-10-CM

## 2017-11-06 PROCEDURE — 77066 DX MAMMO INCL CAD BI: CPT | Performed by: SURGERY

## 2017-11-06 PROCEDURE — 77062 BREAST TOMOSYNTHESIS BI: CPT | Performed by: SURGERY

## 2018-01-18 ENCOUNTER — LAB ENCOUNTER (OUTPATIENT)
Dept: LAB | Age: 73
End: 2018-01-18
Attending: INTERNAL MEDICINE
Payer: MEDICARE

## 2018-01-18 DIAGNOSIS — N18.30 CKD (CHRONIC KIDNEY DISEASE) STAGE 3, GFR 30-59 ML/MIN (HCC): ICD-10-CM

## 2018-01-18 LAB
25-HYDROXYVITAMIN D (TOTAL): 27.8 NG/ML (ref 30–100)
BASOPHILS # BLD AUTO: 0.03 X10(3) UL (ref 0–0.1)
BASOPHILS NFR BLD AUTO: 0.4 %
BILIRUB UR QL STRIP.AUTO: NEGATIVE
BUN BLD-MCNC: 31 MG/DL (ref 8–20)
CALCIUM BLD-MCNC: 8.5 MG/DL (ref 8.3–10.3)
CHLORIDE: 106 MMOL/L (ref 101–111)
CLARITY UR REFRACT.AUTO: CLEAR
CO2: 29 MMOL/L (ref 22–32)
CREAT BLD-MCNC: 1.12 MG/DL (ref 0.55–1.02)
EOSINOPHIL # BLD AUTO: 0.24 X10(3) UL (ref 0–0.3)
EOSINOPHIL NFR BLD AUTO: 3.2 %
ERYTHROCYTE [DISTWIDTH] IN BLOOD BY AUTOMATED COUNT: 14.8 % (ref 11.5–16)
GLUCOSE BLD-MCNC: 99 MG/DL (ref 70–99)
GLUCOSE UR STRIP.AUTO-MCNC: NEGATIVE MG/DL
HAV IGM SER QL: 2.3 MG/DL (ref 1.7–3)
HCT VFR BLD AUTO: 40.5 % (ref 34–50)
HGB BLD-MCNC: 13.2 G/DL (ref 12–16)
IMMATURE GRANULOCYTE COUNT: 0.02 X10(3) UL (ref 0–1)
IMMATURE GRANULOCYTE RATIO %: 0.3 %
KETONES UR STRIP.AUTO-MCNC: NEGATIVE MG/DL
LEUKOCYTE ESTERASE UR QL STRIP.AUTO: NEGATIVE
LYMPHOCYTES # BLD AUTO: 1.61 X10(3) UL (ref 0.9–4)
LYMPHOCYTES NFR BLD AUTO: 21.7 %
MCH RBC QN AUTO: 27.8 PG (ref 27–33.2)
MCHC RBC AUTO-ENTMCNC: 32.6 G/DL (ref 31–37)
MCV RBC AUTO: 85.4 FL (ref 81–100)
MONOCYTES # BLD AUTO: 0.79 X10(3) UL (ref 0.1–0.6)
MONOCYTES NFR BLD AUTO: 10.7 %
NEUTROPHIL ABS PRELIM: 4.72 X10 (3) UL (ref 1.3–6.7)
NEUTROPHILS # BLD AUTO: 4.72 X10(3) UL (ref 1.3–6.7)
NEUTROPHILS NFR BLD AUTO: 63.7 %
NITRITE UR QL STRIP.AUTO: NEGATIVE
PH UR STRIP.AUTO: 6 [PH] (ref 4.5–8)
PHOSPHATE SERPL-MCNC: 2.8 MG/DL (ref 2.5–4.9)
PLATELET # BLD AUTO: 216 10(3)UL (ref 150–450)
POTASSIUM SERPL-SCNC: 4.2 MMOL/L (ref 3.6–5.1)
PROT UR STRIP.AUTO-MCNC: NEGATIVE MG/DL
PTH-INTACT SERPL-MCNC: 90 PG/ML (ref 11.1–79.5)
RBC # BLD AUTO: 4.74 X10(6)UL (ref 3.8–5.1)
RED CELL DISTRIBUTION WIDTH-SD: 46.1 FL (ref 35.1–46.3)
SODIUM SERPL-SCNC: 141 MMOL/L (ref 136–144)
SP GR UR STRIP.AUTO: 1.01 (ref 1–1.03)
URIC ACID: 6.8 MG/DL (ref 2.4–8)
UROBILINOGEN UR STRIP.AUTO-MCNC: <2 MG/DL
WBC # BLD AUTO: 7.4 X10(3) UL (ref 4–13)

## 2018-01-18 PROCEDURE — 36415 COLL VENOUS BLD VENIPUNCTURE: CPT

## 2018-01-18 PROCEDURE — 81001 URINALYSIS AUTO W/SCOPE: CPT

## 2018-01-18 PROCEDURE — 84550 ASSAY OF BLOOD/URIC ACID: CPT

## 2018-01-18 PROCEDURE — 80048 BASIC METABOLIC PNL TOTAL CA: CPT

## 2018-01-18 PROCEDURE — 83735 ASSAY OF MAGNESIUM: CPT

## 2018-01-18 PROCEDURE — 85025 COMPLETE CBC W/AUTO DIFF WBC: CPT

## 2018-01-18 PROCEDURE — 84100 ASSAY OF PHOSPHORUS: CPT

## 2018-01-18 PROCEDURE — 82306 VITAMIN D 25 HYDROXY: CPT

## 2018-01-18 PROCEDURE — 83970 ASSAY OF PARATHORMONE: CPT

## 2018-01-23 ENCOUNTER — OFFICE VISIT (OUTPATIENT)
Dept: NEPHROLOGY | Facility: CLINIC | Age: 73
End: 2018-01-23

## 2018-01-23 VITALS — SYSTOLIC BLOOD PRESSURE: 132 MMHG | DIASTOLIC BLOOD PRESSURE: 80 MMHG | BODY MASS INDEX: 29 KG/M2 | WEIGHT: 175 LBS

## 2018-01-23 DIAGNOSIS — N18.30 CKD (CHRONIC KIDNEY DISEASE) STAGE 3, GFR 30-59 ML/MIN (HCC): Primary | ICD-10-CM

## 2018-01-23 PROCEDURE — 99213 OFFICE O/P EST LOW 20 MIN: CPT | Performed by: INTERNAL MEDICINE

## 2018-01-23 NOTE — PROGRESS NOTES
Nephrology Progress Note      Emily Conway is a 71year old female. HPI:   Patient presents with:  Chronic Kidney Disease  Stones    71year old female with hx of breast ca, nephrolithasis, CKD - stage III here for follow up.   Patient's kidney s rhonchi.     Abdomen: Soft, non-tender. + bowel sounds, no palpable organomegaly  Extremities: Without clubbing, cyanosis; trace edema   Neurologic: Alert and oriented, normal affect, cranial nerves grossly intact, moving all extremities  Skin: Warm and dry Basophils Absolute Latest Ref Range: 0.00 - 0.10 x10(3) uL 0.03   Immature Granulocyte Absolute Latest Ref Range: 0.00 - 1.00 x10(3) uL 0.02   Neutrophils % Latest Units: % 63.7   Lymphocytes % Latest Units: % 21.7   Monocytes % Latest Units: % 10.7   Eo

## 2018-01-31 RX ORDER — FUROSEMIDE 20 MG/1
20 TABLET ORAL 2 TIMES DAILY
Qty: 60 TABLET | Refills: 11 | Status: SHIPPED | OUTPATIENT
Start: 2018-01-31 | End: 2019-02-11

## 2018-02-16 ENCOUNTER — OFFICE VISIT (OUTPATIENT)
Dept: HEMATOLOGY/ONCOLOGY | Age: 73
End: 2018-02-16
Attending: INTERNAL MEDICINE
Payer: MEDICARE

## 2018-02-16 VITALS
TEMPERATURE: 96 F | HEART RATE: 101 BPM | SYSTOLIC BLOOD PRESSURE: 135 MMHG | RESPIRATION RATE: 18 BRPM | BODY MASS INDEX: 29 KG/M2 | DIASTOLIC BLOOD PRESSURE: 73 MMHG | WEIGHT: 176.63 LBS | OXYGEN SATURATION: 98 %

## 2018-02-16 DIAGNOSIS — C50.812 MALIGNANT NEOPLASM OF OVERLAPPING SITES OF LEFT BREAST IN FEMALE, ESTROGEN RECEPTOR NEGATIVE (HCC): Primary | ICD-10-CM

## 2018-02-16 DIAGNOSIS — Z17.1 MALIGNANT NEOPLASM OF OVERLAPPING SITES OF LEFT BREAST IN FEMALE, ESTROGEN RECEPTOR NEGATIVE (HCC): Primary | ICD-10-CM

## 2018-02-16 PROCEDURE — 99213 OFFICE O/P EST LOW 20 MIN: CPT | Performed by: INTERNAL MEDICINE

## 2018-02-16 NOTE — PROGRESS NOTES
Cancer Center Progress Note    Problem List:      Patient Active Problem List:     Breast cancer, right breast ([facility] [address]     Colon polyps     DVT     Unspecified asthma(493.90)     Vitamin D deficiency     Osteoarthrosis, unspecified whether generalized or loc by mouth every 6 (six) hours as needed for Fever. Disp:  Rfl:    furosemide 20 MG Oral Tab Take 1 tablet (20 mg total) by mouth 2 (two) times daily. Disp: 60 tablet Rfl: 11   Cholecalciferol (VITAMIN D) 2000 units Oral Cap Take 2,000 Units by mouth daily. 13.2 01/18/2018   HGB 10.1 (L) 01/27/2016   HCT 40.5 01/18/2018   HCT 31.4 (L) 01/27/2016   MCV 85.4 01/18/2018   MCV 86.5 01/27/2016   MCV 88 07/22/2011   MCH 27.8 01/18/2018   MCH 27.8 01/27/2016   MCH 28.9 07/22/2011   MCHC 32.6 01/18/2018   MCHC 32.2 0

## 2018-03-22 PROBLEM — R10.13 EPIGASTRIC PAIN: Status: ACTIVE | Noted: 2018-03-22

## 2018-03-22 PROBLEM — K92.89 GAS BLOAT SYNDROME: Status: ACTIVE | Noted: 2018-03-22

## 2018-05-04 PROBLEM — M16.11 PRIMARY OSTEOARTHRITIS OF RIGHT HIP: Status: ACTIVE | Noted: 2018-05-04

## 2018-05-04 PROBLEM — M54.31 SCIATICA OF RIGHT SIDE: Status: ACTIVE | Noted: 2018-05-04

## 2018-05-18 PROBLEM — M17.11 PRIMARY OSTEOARTHRITIS OF RIGHT KNEE: Status: ACTIVE | Noted: 2018-05-18

## 2018-08-17 ENCOUNTER — APPOINTMENT (OUTPATIENT)
Dept: HEMATOLOGY/ONCOLOGY | Age: 73
End: 2018-08-17
Attending: INTERNAL MEDICINE
Payer: MEDICARE

## 2018-08-24 ENCOUNTER — OFFICE VISIT (OUTPATIENT)
Dept: HEMATOLOGY/ONCOLOGY | Age: 73
End: 2018-08-24
Attending: INTERNAL MEDICINE
Payer: MEDICARE

## 2018-08-24 VITALS
TEMPERATURE: 97 F | RESPIRATION RATE: 18 BRPM | OXYGEN SATURATION: 100 % | WEIGHT: 179.63 LBS | BODY MASS INDEX: 30 KG/M2 | HEART RATE: 103 BPM

## 2018-08-24 DIAGNOSIS — Z17.1 MALIGNANT NEOPLASM OF OVERLAPPING SITES OF LEFT BREAST IN FEMALE, ESTROGEN RECEPTOR NEGATIVE (HCC): Primary | ICD-10-CM

## 2018-08-24 DIAGNOSIS — C50.812 MALIGNANT NEOPLASM OF OVERLAPPING SITES OF LEFT BREAST IN FEMALE, ESTROGEN RECEPTOR NEGATIVE (HCC): Primary | ICD-10-CM

## 2018-08-24 PROCEDURE — 99213 OFFICE O/P EST LOW 20 MIN: CPT | Performed by: INTERNAL MEDICINE

## 2018-08-24 NOTE — PROGRESS NOTES
Cancer Center Progress Note    Problem List:      Patient Active Problem List:     Breast cancer, right breast ([facility] [address]     Colon polyps     DVT     Unspecified asthma(493.90)     Vitamin D deficiency     Osteoarthrosis, unspecified whether generalized or loc Analgesics       NAUSEA AND VOMITING  Seasonal                    Medications:    Pantoprazole Sodium 20 MG Oral Tab EC Take 1 tablet (20 mg total) by mouth every morning before breakfast. Disp: 30 tablet Rfl: 6   ibuprofen 100 MG Oral Tab Take 100 mg by m 01/18/2018   MCV 85.4 01/18/2018   MCH 27.8 01/18/2018   MCHC 32.6 01/18/2018   RDW 14.8 01/18/2018   .0 01/18/2018       Lab Results  Component Value Date    01/18/2018   K 4.2 01/18/2018    01/18/2018   CO2 29.0 01/18/2018   BUN 31 (H)

## 2018-10-18 ENCOUNTER — HOSPITAL ENCOUNTER (OUTPATIENT)
Dept: GENERAL RADIOLOGY | Age: 73
Discharge: HOME | End: 2018-10-18
Attending: NURSE PRACTITIONER
Payer: MEDICARE

## 2018-10-18 DIAGNOSIS — K92.89 GAS BLOAT SYNDROME: ICD-10-CM

## 2018-10-18 PROBLEM — R10.33 PERIUMBILICAL ABDOMINAL PAIN: Status: ACTIVE | Noted: 2018-10-18

## 2018-10-18 PROCEDURE — 74018 RADEX ABDOMEN 1 VIEW: CPT | Performed by: NURSE PRACTITIONER

## 2018-11-08 ENCOUNTER — HOSPITAL ENCOUNTER (OUTPATIENT)
Dept: MAMMOGRAPHY | Age: 73
Discharge: HOME | End: 2018-11-08
Attending: INTERNAL MEDICINE
Payer: MEDICARE

## 2018-11-08 DIAGNOSIS — Z17.1 MALIGNANT NEOPLASM OF OVERLAPPING SITES OF LEFT BREAST IN FEMALE, ESTROGEN RECEPTOR NEGATIVE (HCC): ICD-10-CM

## 2018-11-08 DIAGNOSIS — C50.812 MALIGNANT NEOPLASM OF OVERLAPPING SITES OF LEFT BREAST IN FEMALE, ESTROGEN RECEPTOR NEGATIVE (HCC): ICD-10-CM

## 2018-11-08 PROCEDURE — 77066 DX MAMMO INCL CAD BI: CPT | Performed by: INTERNAL MEDICINE

## 2018-11-08 PROCEDURE — 77062 BREAST TOMOSYNTHESIS BI: CPT | Performed by: INTERNAL MEDICINE

## 2018-12-06 PROCEDURE — 87186 SC STD MICRODIL/AGAR DIL: CPT | Performed by: EMERGENCY MEDICINE

## 2018-12-06 PROCEDURE — 87077 CULTURE AEROBIC IDENTIFY: CPT | Performed by: EMERGENCY MEDICINE

## 2018-12-06 PROCEDURE — 87086 URINE CULTURE/COLONY COUNT: CPT | Performed by: EMERGENCY MEDICINE

## 2018-12-11 ENCOUNTER — HOSPITAL ENCOUNTER (OUTPATIENT)
Dept: CT IMAGING | Age: 73
Discharge: HOME | End: 2018-12-11
Attending: SURGERY
Payer: MEDICARE

## 2018-12-11 DIAGNOSIS — R10.9 ABDOMINAL PAIN, UNSPECIFIED ABDOMINAL LOCATION: ICD-10-CM

## 2018-12-11 PROCEDURE — 82565 ASSAY OF CREATININE: CPT

## 2018-12-11 PROCEDURE — 74177 CT ABD & PELVIS W/CONTRAST: CPT | Performed by: SURGERY

## 2019-01-21 ENCOUNTER — LAB ENCOUNTER (OUTPATIENT)
Dept: LAB | Age: 74
End: 2019-01-21
Attending: INTERNAL MEDICINE
Payer: MEDICARE

## 2019-01-21 DIAGNOSIS — N18.30 CKD (CHRONIC KIDNEY DISEASE) STAGE 3, GFR 30-59 ML/MIN (HCC): ICD-10-CM

## 2019-01-21 LAB
ANION GAP SERPL CALC-SCNC: 5 MMOL/L (ref 0–18)
BASOPHILS # BLD AUTO: 0.02 X10(3) UL (ref 0–0.1)
BASOPHILS NFR BLD AUTO: 0.2 %
BILIRUB UR QL STRIP.AUTO: NEGATIVE
BUN BLD-MCNC: 24 MG/DL (ref 8–20)
BUN/CREAT SERPL: 20.3 (ref 10–20)
CALCIUM BLD-MCNC: 8.9 MG/DL (ref 8.3–10.3)
CHLORIDE SERPL-SCNC: 109 MMOL/L (ref 101–111)
CLARITY UR REFRACT.AUTO: CLEAR
CO2 SERPL-SCNC: 31 MMOL/L (ref 22–32)
COLOR UR AUTO: COLORLESS
CREAT BLD-MCNC: 1.18 MG/DL (ref 0.55–1.02)
EOSINOPHIL # BLD AUTO: 0.17 X10(3) UL (ref 0–0.3)
EOSINOPHIL NFR BLD AUTO: 1.7 %
ERYTHROCYTE [DISTWIDTH] IN BLOOD BY AUTOMATED COUNT: 14.7 % (ref 11.5–16)
GLUCOSE BLD-MCNC: 135 MG/DL (ref 70–99)
GLUCOSE UR STRIP.AUTO-MCNC: NEGATIVE MG/DL
HAV IGM SER QL: 2.3 MG/DL (ref 1.8–2.5)
HCT VFR BLD AUTO: 41.2 % (ref 34–50)
HGB BLD-MCNC: 13.2 G/DL (ref 12–16)
IMMATURE GRANULOCYTE COUNT: 0.04 X10(3) UL (ref 0–1)
IMMATURE GRANULOCYTE RATIO %: 0.4 %
KETONES UR STRIP.AUTO-MCNC: NEGATIVE MG/DL
LEUKOCYTE ESTERASE UR QL STRIP.AUTO: NEGATIVE
LYMPHOCYTES # BLD AUTO: 2.63 X10(3) UL (ref 0.9–4)
LYMPHOCYTES NFR BLD AUTO: 26.2 %
MCH RBC QN AUTO: 28.3 PG (ref 27–33.2)
MCHC RBC AUTO-ENTMCNC: 32 G/DL (ref 31–37)
MCV RBC AUTO: 88.2 FL (ref 81–100)
MONOCYTES # BLD AUTO: 0.87 X10(3) UL (ref 0.1–1)
MONOCYTES NFR BLD AUTO: 8.7 %
NEUTROPHIL ABS PRELIM: 6.31 X10 (3) UL (ref 1.3–6.7)
NEUTROPHILS # BLD AUTO: 6.31 X10(3) UL (ref 1.3–6.7)
NEUTROPHILS NFR BLD AUTO: 62.8 %
NITRITE UR QL STRIP.AUTO: NEGATIVE
OSMOLALITY SERPL CALC.SUM OF ELEC: 306 MOSM/KG (ref 275–295)
PH UR STRIP.AUTO: 7 [PH] (ref 4.5–8)
PHOSPHATE SERPL-MCNC: 1.9 MG/DL (ref 2.5–4.9)
PLATELET # BLD AUTO: 203 10(3)UL (ref 150–450)
POTASSIUM SERPL-SCNC: 3.7 MMOL/L (ref 3.6–5.1)
PROT UR STRIP.AUTO-MCNC: NEGATIVE MG/DL
PTH-INTACT SERPL-MCNC: 62.9 PG/ML (ref 11.1–79.5)
RBC # BLD AUTO: 4.67 X10(6)UL (ref 3.8–5.1)
RBC UR QL AUTO: NEGATIVE
RED CELL DISTRIBUTION WIDTH-SD: 47.3 FL (ref 35.1–46.3)
SODIUM SERPL-SCNC: 145 MMOL/L (ref 136–144)
SP GR UR STRIP.AUTO: <1.005 (ref 1–1.03)
URATE SERPL-MCNC: 6.2 MG/DL (ref 2.4–8)
UROBILINOGEN UR STRIP.AUTO-MCNC: <2 MG/DL
VIT D+METAB SERPL-MCNC: 34.1 NG/ML (ref 30–100)
WBC # BLD AUTO: 10 X10(3) UL (ref 4–13)

## 2019-01-21 PROCEDURE — 82306 VITAMIN D 25 HYDROXY: CPT

## 2019-01-21 PROCEDURE — 36415 COLL VENOUS BLD VENIPUNCTURE: CPT

## 2019-01-21 PROCEDURE — 83735 ASSAY OF MAGNESIUM: CPT

## 2019-01-21 PROCEDURE — 83970 ASSAY OF PARATHORMONE: CPT

## 2019-01-21 PROCEDURE — 85025 COMPLETE CBC W/AUTO DIFF WBC: CPT

## 2019-01-21 PROCEDURE — 84100 ASSAY OF PHOSPHORUS: CPT

## 2019-01-21 PROCEDURE — 84550 ASSAY OF BLOOD/URIC ACID: CPT

## 2019-01-21 PROCEDURE — 80048 BASIC METABOLIC PNL TOTAL CA: CPT

## 2019-01-21 PROCEDURE — 81003 URINALYSIS AUTO W/O SCOPE: CPT

## 2019-02-05 ENCOUNTER — OFFICE VISIT (OUTPATIENT)
Dept: NEPHROLOGY | Facility: CLINIC | Age: 74
End: 2019-02-05
Payer: MEDICARE

## 2019-02-05 VITALS — WEIGHT: 180 LBS | SYSTOLIC BLOOD PRESSURE: 138 MMHG | BODY MASS INDEX: 31 KG/M2 | DIASTOLIC BLOOD PRESSURE: 74 MMHG

## 2019-02-05 DIAGNOSIS — N18.30 CKD (CHRONIC KIDNEY DISEASE) STAGE 3, GFR 30-59 ML/MIN (HCC): Primary | ICD-10-CM

## 2019-02-05 PROCEDURE — 99213 OFFICE O/P EST LOW 20 MIN: CPT | Performed by: INTERNAL MEDICINE

## 2019-02-05 NOTE — PROGRESS NOTES
Nephrology Progress Note      Brijesh Miller is a 68year old female. HPI:   Patient presents with:  Chronic Kidney Disease  Stones    68year old female with hx of breast ca, nephrolithasis, CKD - stage III here for follow up.   Patient's kidney s VOMITING  Seasonal                      ROS:      See above; 12 systems reviewed and otherwise unremarkable     PHYSICAL EXAM:   There were no vitals taken for this visit.   Wt Readings from Last 6 Encounters:  01/31/19 : 182 lb 2 oz  12/26/18 : 170 lb  12/ mmol/L 109   Carbon Dioxide, Total      22.0 - 32.0 mmol/L 31.0   ANION GAP      0 - 18 mmol/L 5   BUN      8 - 20 mg/dL 24 (H)   CREATININE      0.55 - 1.02 mg/dL 1.18 (H)   BUN/CREA RATIO      10.0 - 20.0 20.3 (H)   CALCIUM      8.3 - 10.3 mg/dL 8.9   CA

## 2019-02-11 RX ORDER — FUROSEMIDE 20 MG/1
20 TABLET ORAL 2 TIMES DAILY
Qty: 60 TABLET | Refills: 5 | Status: SHIPPED | OUTPATIENT
Start: 2019-02-11 | End: 2020-01-28

## 2019-02-22 ENCOUNTER — OFFICE VISIT (OUTPATIENT)
Dept: HEMATOLOGY/ONCOLOGY | Age: 74
End: 2019-02-22
Attending: INTERNAL MEDICINE
Payer: MEDICARE

## 2019-02-22 VITALS
BODY MASS INDEX: 30 KG/M2 | SYSTOLIC BLOOD PRESSURE: 150 MMHG | DIASTOLIC BLOOD PRESSURE: 75 MMHG | HEART RATE: 98 BPM | TEMPERATURE: 96 F | WEIGHT: 179.38 LBS | OXYGEN SATURATION: 99 % | RESPIRATION RATE: 18 BRPM

## 2019-02-22 DIAGNOSIS — Z17.1 MALIGNANT NEOPLASM OF OVERLAPPING SITES OF LEFT BREAST IN FEMALE, ESTROGEN RECEPTOR NEGATIVE (HCC): Primary | ICD-10-CM

## 2019-02-22 DIAGNOSIS — C50.812 MALIGNANT NEOPLASM OF OVERLAPPING SITES OF LEFT BREAST IN FEMALE, ESTROGEN RECEPTOR NEGATIVE (HCC): Primary | ICD-10-CM

## 2019-02-22 PROCEDURE — 99213 OFFICE O/P EST LOW 20 MIN: CPT | Performed by: INTERNAL MEDICINE

## 2019-02-22 NOTE — PROGRESS NOTES
Cancer Center Progress Note    Problem List:      Patient Active Problem List:     Breast cancer, right breast ([facility] [address]     Colon polyps     DVT     Unspecified asthma(493.90)     Vitamin D deficiency     Osteoarthrosis, unspecified whether generalized or loc lymphadenopathy.       Allergies:       Prednisone              DIARRHEA, DIZZINESS, NAUSEA ONLY  Sulfa Antibiotics       HIVES  Codeine                 NAUSEA AND VOMITING  Opioid Analgesics       NAUSEA AND VOMITING  Seasonal                    Medication Right lumpectomy site has ROE. No mass. The left has hyperpigmentation from radiation. The lateral scar is well healed. ROE. Abdomen: Soft, non tender with good bowel sounds. Extremities: No edema. Lymphatics:  There is no palpable lymphadenopathy throug

## 2019-03-04 PROBLEM — N17.9 ACUTE KIDNEY FAILURE, UNSPECIFIED (HCC): Status: RESOLVED | Noted: 2019-03-04 | Resolved: 2019-03-04

## 2019-03-04 PROBLEM — N17.9 ACUTE KIDNEY FAILURE, UNSPECIFIED: Status: RESOLVED | Noted: 2019-03-04 | Resolved: 2019-03-04

## 2019-03-04 PROBLEM — N17.9 ACUTE KIDNEY FAILURE, UNSPECIFIED (HCC): Status: ACTIVE | Noted: 2019-03-04

## 2019-03-04 PROBLEM — N17.9 ACUTE KIDNEY FAILURE, UNSPECIFIED: Status: ACTIVE | Noted: 2019-03-04

## 2019-03-04 PROBLEM — K43.9 ABDOMINAL WALL HERNIA: Status: RESOLVED | Noted: 2017-07-24 | Resolved: 2019-03-04

## 2019-05-16 PROBLEM — R10.33 PERIUMBILICAL ABDOMINAL PAIN: Status: RESOLVED | Noted: 2018-10-18 | Resolved: 2019-05-16

## 2019-05-16 PROBLEM — Z86.718 HISTORY OF DVT OF LOWER EXTREMITY: Status: ACTIVE | Noted: 2019-05-16

## 2019-05-16 PROBLEM — E74.39 GLUCOSE INTOLERANCE: Status: ACTIVE | Noted: 2019-05-16

## 2019-05-16 PROBLEM — R10.13 EPIGASTRIC PAIN: Status: RESOLVED | Noted: 2018-03-22 | Resolved: 2019-05-16

## 2019-05-17 RX ORDER — LORATADINE 10 MG/1
10 TABLET ORAL DAILY
COMMUNITY

## 2019-05-29 PROCEDURE — 87086 URINE CULTURE/COLONY COUNT: CPT | Performed by: EMERGENCY MEDICINE

## 2019-06-04 ENCOUNTER — LABORATORY ENCOUNTER (OUTPATIENT)
Dept: LAB | Age: 74
End: 2019-06-04
Attending: ORTHOPAEDIC SURGERY
Payer: MEDICARE

## 2019-06-04 ENCOUNTER — APPOINTMENT (OUTPATIENT)
Dept: LAB | Age: 74
End: 2019-06-04
Attending: ORTHOPAEDIC SURGERY
Payer: MEDICARE

## 2019-06-04 DIAGNOSIS — M17.11 PRIMARY OSTEOARTHRITIS OF RIGHT KNEE: ICD-10-CM

## 2019-06-04 PROCEDURE — 93005 ELECTROCARDIOGRAM TRACING: CPT

## 2019-06-04 PROCEDURE — 86900 BLOOD TYPING SEROLOGIC ABO: CPT

## 2019-06-04 PROCEDURE — 85610 PROTHROMBIN TIME: CPT

## 2019-06-04 PROCEDURE — 36415 COLL VENOUS BLD VENIPUNCTURE: CPT

## 2019-06-04 PROCEDURE — 85730 THROMBOPLASTIN TIME PARTIAL: CPT

## 2019-06-04 PROCEDURE — 87081 CULTURE SCREEN ONLY: CPT

## 2019-06-04 PROCEDURE — 86901 BLOOD TYPING SEROLOGIC RH(D): CPT

## 2019-06-04 PROCEDURE — 80053 COMPREHEN METABOLIC PANEL: CPT

## 2019-06-04 PROCEDURE — 85025 COMPLETE CBC W/AUTO DIFF WBC: CPT

## 2019-06-04 PROCEDURE — 93010 ELECTROCARDIOGRAM REPORT: CPT | Performed by: INTERNAL MEDICINE

## 2019-06-04 PROCEDURE — 81001 URINALYSIS AUTO W/SCOPE: CPT

## 2019-06-04 PROCEDURE — 86850 RBC ANTIBODY SCREEN: CPT

## 2019-06-10 ENCOUNTER — HOSPITAL ENCOUNTER (OUTPATIENT)
Dept: PHYSICAL THERAPY | Facility: HOSPITAL | Age: 74
Discharge: HOME | End: 2019-06-10
Attending: ORTHOPAEDIC SURGERY
Payer: MEDICARE

## 2019-06-10 DIAGNOSIS — M17.11 PRIMARY OSTEOARTHRITIS OF RIGHT KNEE: ICD-10-CM

## 2019-06-11 NOTE — H&P
[address] [place] Patient Status:  Outpatient in a Bed    1945 MRN [id number]   [facility] SURGERY Attending Humera Ding MD   Hosp Day # 0 PCP Marialuisa Riggs MD     Date of Admission:  (Not MD at [facility] MAIN OR   • COLON RESECTION LOW ANTERIOR (SIGMOID) N/A 12/19/2015    Performed by Raman Gonzalez MD at [address] [place] [address]   • COLONOSCOPY  12/1/2006    POLYPS, RPT 5 YRS   • COLONOSCOPY  09/17/13    Domenico Elaine (rpt 3 yrs)   • COLONOSCOPY N/A 4/26/2016    Perfor • Colon Cancer Paternal Grandfather         80   • Diabetes Paternal Grandmother    • Heart Disorder Paternal Grandmother    • Breast Cancer Self 61   • Breast Cancer Paternal Aunt 48        estimate   • Other (Other) Sister         lupus   • Breast Canc side     Primary osteoarthritis of right hip     Primary osteoarthritis of right knee     History of DVT of lower extremity     Glucose intolerance      Plan is for cemented right total knee arthroplasty        Ame Islas  6/11/2019  1:49 PM

## 2019-06-12 ENCOUNTER — HOSPITAL ENCOUNTER (OUTPATIENT)
Facility: HOSPITAL | Age: 74
Discharge: HOME HEALTH | End: 2019-06-13
Attending: ORTHOPAEDIC SURGERY | Admitting: ORTHOPAEDIC SURGERY
Payer: MEDICARE

## 2019-06-12 ENCOUNTER — ANESTHESIA EVENT (OUTPATIENT)
Dept: SURGERY | Facility: HOSPITAL | Age: 74
End: 2019-06-12
Payer: MEDICARE

## 2019-06-12 ENCOUNTER — ANESTHESIA (OUTPATIENT)
Dept: SURGERY | Facility: HOSPITAL | Age: 74
End: 2019-06-12
Payer: MEDICARE

## 2019-06-12 DIAGNOSIS — M17.11 PRIMARY OSTEOARTHRITIS OF RIGHT KNEE: Primary | ICD-10-CM

## 2019-06-12 PROCEDURE — 97161 PT EVAL LOW COMPLEX 20 MIN: CPT

## 2019-06-12 PROCEDURE — 76942 ECHO GUIDE FOR BIOPSY: CPT | Performed by: ORTHOPAEDIC SURGERY

## 2019-06-12 PROCEDURE — 88305 TISSUE EXAM BY PATHOLOGIST: CPT | Performed by: ORTHOPAEDIC SURGERY

## 2019-06-12 PROCEDURE — 96375 TX/PRO/DX INJ NEW DRUG ADDON: CPT

## 2019-06-12 PROCEDURE — 88311 DECALCIFY TISSUE: CPT | Performed by: ORTHOPAEDIC SURGERY

## 2019-06-12 PROCEDURE — 97116 GAIT TRAINING THERAPY: CPT

## 2019-06-12 PROCEDURE — 0SRC069 REPLACEMENT OF RIGHT KNEE JOINT WITH OXIDIZED ZIRCONIUM ON POLYETHYLENE SYNTHETIC SUBSTITUTE, CEMENTED, OPEN APPROACH: ICD-10-PCS | Performed by: ORTHOPAEDIC SURGERY

## 2019-06-12 DEVICE — ATTUNE KNEE SYSTEM FEMORAL POSTERIOR STABILIZED SIZE 5 RIGHT CEMENTED
Type: IMPLANTABLE DEVICE | Site: KNEE | Status: FUNCTIONAL
Brand: ATTUNE

## 2019-06-12 DEVICE — ATTUNE KNEE SYSTEM TIBIAL INSERT ROTATING PLATFORM POSTERIOR STABILIZED 5 8MM AOX
Type: IMPLANTABLE DEVICE | Site: KNEE | Status: FUNCTIONAL
Brand: ATTUNE

## 2019-06-12 DEVICE — ATTUNE PATELLA MEDIALIZED DOME 38MM CEMENTED AOX
Type: IMPLANTABLE DEVICE | Site: KNEE | Status: FUNCTIONAL
Brand: ATTUNE

## 2019-06-12 DEVICE — ATTUNE KNEE SYSTEM TIBIAL BASE ROTATING PLATFORM SIZE 6 CEMENTED
Type: IMPLANTABLE DEVICE | Site: KNEE | Status: FUNCTIONAL
Brand: ATTUNE

## 2019-06-12 DEVICE — SMARTSET HV HIGH VISCOSITY BONE CEMENT 40G
Type: IMPLANTABLE DEVICE | Site: KNEE | Status: FUNCTIONAL
Brand: SMARTSET

## 2019-06-12 RX ORDER — HYDROCODONE BITARTRATE AND ACETAMINOPHEN 10; 325 MG/1; MG/1
1-2 TABLET ORAL EVERY 4 HOURS PRN
Qty: 60 TABLET | Refills: 0 | Status: SHIPPED | OUTPATIENT
Start: 2019-06-12 | End: 2019-06-27

## 2019-06-12 RX ORDER — MEPERIDINE HYDROCHLORIDE 25 MG/ML
12.5 INJECTION INTRAMUSCULAR; INTRAVENOUS; SUBCUTANEOUS AS NEEDED
Status: DISCONTINUED | OUTPATIENT
Start: 2019-06-12 | End: 2019-06-12 | Stop reason: HOSPADM

## 2019-06-12 RX ORDER — NALOXONE HYDROCHLORIDE 0.4 MG/ML
80 INJECTION, SOLUTION INTRAMUSCULAR; INTRAVENOUS; SUBCUTANEOUS AS NEEDED
Status: DISCONTINUED | OUTPATIENT
Start: 2019-06-12 | End: 2019-06-12 | Stop reason: HOSPADM

## 2019-06-12 RX ORDER — METOCLOPRAMIDE HYDROCHLORIDE 5 MG/ML
10 INJECTION INTRAMUSCULAR; INTRAVENOUS EVERY 6 HOURS PRN
Status: DISCONTINUED | OUTPATIENT
Start: 2019-06-12 | End: 2019-06-13

## 2019-06-12 RX ORDER — CETIRIZINE HYDROCHLORIDE 1 MG/ML
10 SOLUTION ORAL DAILY
Status: DISCONTINUED | OUTPATIENT
Start: 2019-06-13 | End: 2019-06-13

## 2019-06-12 RX ORDER — ACETAMINOPHEN 325 MG/1
650 TABLET ORAL ONCE
Status: COMPLETED | OUTPATIENT
Start: 2019-06-12 | End: 2019-06-12

## 2019-06-12 RX ORDER — HYDROMORPHONE HYDROCHLORIDE 1 MG/ML
0.4 INJECTION, SOLUTION INTRAMUSCULAR; INTRAVENOUS; SUBCUTANEOUS EVERY 2 HOUR PRN
Status: DISCONTINUED | OUTPATIENT
Start: 2019-06-12 | End: 2019-06-13

## 2019-06-12 RX ORDER — SENNOSIDES 8.6 MG
17.2 TABLET ORAL NIGHTLY
Status: DISCONTINUED | OUTPATIENT
Start: 2019-06-12 | End: 2019-06-13

## 2019-06-12 RX ORDER — BISACODYL 10 MG
10 SUPPOSITORY, RECTAL RECTAL
Status: DISCONTINUED | OUTPATIENT
Start: 2019-06-12 | End: 2019-06-13

## 2019-06-12 RX ORDER — SODIUM CHLORIDE, SODIUM LACTATE, POTASSIUM CHLORIDE, CALCIUM CHLORIDE 600; 310; 30; 20 MG/100ML; MG/100ML; MG/100ML; MG/100ML
INJECTION, SOLUTION INTRAVENOUS CONTINUOUS
Status: DISCONTINUED | OUTPATIENT
Start: 2019-06-12 | End: 2019-06-12 | Stop reason: HOSPADM

## 2019-06-12 RX ORDER — HYDROCODONE BITARTRATE AND ACETAMINOPHEN 5; 325 MG/1; MG/1
2 TABLET ORAL EVERY 4 HOURS PRN
Status: DISCONTINUED | OUTPATIENT
Start: 2019-06-12 | End: 2019-06-13

## 2019-06-12 RX ORDER — POLYETHYLENE GLYCOL 3350 17 G/17G
17 POWDER, FOR SOLUTION ORAL DAILY PRN
Status: DISCONTINUED | OUTPATIENT
Start: 2019-06-12 | End: 2019-06-13

## 2019-06-12 RX ORDER — DIPHENHYDRAMINE HCL 25 MG
25 CAPSULE ORAL EVERY 4 HOURS PRN
Status: DISCONTINUED | OUTPATIENT
Start: 2019-06-12 | End: 2019-06-13

## 2019-06-12 RX ORDER — PROCHLORPERAZINE EDISYLATE 5 MG/ML
10 INJECTION INTRAMUSCULAR; INTRAVENOUS EVERY 6 HOURS PRN
Status: DISCONTINUED | OUTPATIENT
Start: 2019-06-12 | End: 2019-06-13

## 2019-06-12 RX ORDER — OXYCODONE HYDROCHLORIDE 15 MG/1
15 TABLET ORAL EVERY 4 HOURS PRN
Status: DISCONTINUED | OUTPATIENT
Start: 2019-06-12 | End: 2019-06-12

## 2019-06-12 RX ORDER — FUROSEMIDE 20 MG/1
20 TABLET ORAL
Status: DISCONTINUED | OUTPATIENT
Start: 2019-06-12 | End: 2019-06-12

## 2019-06-12 RX ORDER — SODIUM CHLORIDE, SODIUM LACTATE, POTASSIUM CHLORIDE, CALCIUM CHLORIDE 600; 310; 30; 20 MG/100ML; MG/100ML; MG/100ML; MG/100ML
INJECTION, SOLUTION INTRAVENOUS CONTINUOUS
Status: DISCONTINUED | OUTPATIENT
Start: 2019-06-12 | End: 2019-06-13

## 2019-06-12 RX ORDER — DIPHENHYDRAMINE HYDROCHLORIDE 50 MG/ML
12.5 INJECTION INTRAMUSCULAR; INTRAVENOUS EVERY 4 HOURS PRN
Status: DISCONTINUED | OUTPATIENT
Start: 2019-06-12 | End: 2019-06-13

## 2019-06-12 RX ORDER — MEPERIDINE HYDROCHLORIDE 25 MG/ML
INJECTION INTRAMUSCULAR; INTRAVENOUS; SUBCUTANEOUS
Status: COMPLETED
Start: 2019-06-12 | End: 2019-06-12

## 2019-06-12 RX ORDER — TRAMADOL HYDROCHLORIDE 50 MG/1
50 TABLET ORAL EVERY 6 HOURS PRN
Status: DISCONTINUED | OUTPATIENT
Start: 2019-06-12 | End: 2019-06-13

## 2019-06-12 RX ORDER — PANTOPRAZOLE SODIUM 20 MG/1
20 TABLET, DELAYED RELEASE ORAL
Status: DISCONTINUED | OUTPATIENT
Start: 2019-06-13 | End: 2019-06-13

## 2019-06-12 RX ORDER — TIZANIDINE 2 MG/1
1 TABLET ORAL 3 TIMES DAILY PRN
Status: DISCONTINUED | OUTPATIENT
Start: 2019-06-12 | End: 2019-06-13

## 2019-06-12 RX ORDER — KETOROLAC TROMETHAMINE 15 MG/ML
15 INJECTION, SOLUTION INTRAMUSCULAR; INTRAVENOUS EVERY 6 HOURS
Status: COMPLETED | OUTPATIENT
Start: 2019-06-12 | End: 2019-06-13

## 2019-06-12 RX ORDER — ACETAMINOPHEN 325 MG/1
TABLET ORAL
Status: COMPLETED
Start: 2019-06-12 | End: 2019-06-12

## 2019-06-12 RX ORDER — SODIUM PHOSPHATE, DIBASIC AND SODIUM PHOSPHATE, MONOBASIC 7; 19 G/133ML; G/133ML
1 ENEMA RECTAL ONCE AS NEEDED
Status: DISCONTINUED | OUTPATIENT
Start: 2019-06-12 | End: 2019-06-13

## 2019-06-12 RX ORDER — OXYCODONE HYDROCHLORIDE 10 MG/1
10 TABLET ORAL EVERY 4 HOURS PRN
Status: DISCONTINUED | OUTPATIENT
Start: 2019-06-12 | End: 2019-06-12

## 2019-06-12 RX ORDER — ONDANSETRON 2 MG/ML
4 INJECTION INTRAMUSCULAR; INTRAVENOUS EVERY 4 HOURS PRN
Status: DISCONTINUED | OUTPATIENT
Start: 2019-06-12 | End: 2019-06-13

## 2019-06-12 RX ORDER — ACETAMINOPHEN 325 MG/1
650 TABLET ORAL 4 TIMES DAILY
Status: DISCONTINUED | OUTPATIENT
Start: 2019-06-12 | End: 2019-06-12

## 2019-06-12 RX ORDER — TRAMADOL HYDROCHLORIDE 50 MG/1
50 TABLET ORAL EVERY 6 HOURS PRN
Qty: 50 TABLET | Refills: 0 | Status: SHIPPED | OUTPATIENT
Start: 2019-06-12 | End: 2019-06-13

## 2019-06-12 RX ORDER — MELATONIN
325
Status: DISCONTINUED | OUTPATIENT
Start: 2019-06-12 | End: 2019-06-13

## 2019-06-12 RX ORDER — HYDROCODONE BITARTRATE AND ACETAMINOPHEN 5; 325 MG/1; MG/1
1 TABLET ORAL EVERY 4 HOURS PRN
Status: DISCONTINUED | OUTPATIENT
Start: 2019-06-12 | End: 2019-06-13

## 2019-06-12 RX ORDER — DIPHENHYDRAMINE HYDROCHLORIDE 50 MG/ML
25 INJECTION INTRAMUSCULAR; INTRAVENOUS ONCE AS NEEDED
Status: ACTIVE | OUTPATIENT
Start: 2019-06-12 | End: 2019-06-12

## 2019-06-12 RX ORDER — ONDANSETRON 2 MG/ML
4 INJECTION INTRAMUSCULAR; INTRAVENOUS AS NEEDED
Status: DISCONTINUED | OUTPATIENT
Start: 2019-06-12 | End: 2019-06-12 | Stop reason: HOSPADM

## 2019-06-12 RX ORDER — CEFAZOLIN SODIUM/WATER 2 G/20 ML
2 SYRINGE (ML) INTRAVENOUS EVERY 8 HOURS
Status: COMPLETED | OUTPATIENT
Start: 2019-06-12 | End: 2019-06-12

## 2019-06-12 RX ORDER — MIDAZOLAM HYDROCHLORIDE 1 MG/ML
1 INJECTION INTRAMUSCULAR; INTRAVENOUS EVERY 5 MIN PRN
Status: DISCONTINUED | OUTPATIENT
Start: 2019-06-12 | End: 2019-06-12 | Stop reason: HOSPADM

## 2019-06-12 RX ORDER — CEFAZOLIN SODIUM/WATER 2 G/20 ML
SYRINGE (ML) INTRAVENOUS
Status: DISPENSED
Start: 2019-06-12 | End: 2019-06-12

## 2019-06-12 RX ORDER — DOCUSATE SODIUM 100 MG/1
100 CAPSULE, LIQUID FILLED ORAL 2 TIMES DAILY
Status: DISCONTINUED | OUTPATIENT
Start: 2019-06-12 | End: 2019-06-13

## 2019-06-12 RX ORDER — HYDRALAZINE HYDROCHLORIDE 20 MG/ML
10 INJECTION INTRAMUSCULAR; INTRAVENOUS EVERY 6 HOURS PRN
Status: DISCONTINUED | OUTPATIENT
Start: 2019-06-12 | End: 2019-06-13

## 2019-06-12 RX ORDER — CEFAZOLIN SODIUM/WATER 2 G/20 ML
2 SYRINGE (ML) INTRAVENOUS ONCE
Status: DISCONTINUED | OUTPATIENT
Start: 2019-06-12 | End: 2019-06-12 | Stop reason: HOSPADM

## 2019-06-12 RX ORDER — HYDROMORPHONE HYDROCHLORIDE 1 MG/ML
0.2 INJECTION, SOLUTION INTRAMUSCULAR; INTRAVENOUS; SUBCUTANEOUS EVERY 2 HOUR PRN
Status: DISCONTINUED | OUTPATIENT
Start: 2019-06-12 | End: 2019-06-13

## 2019-06-12 RX ORDER — TIZANIDINE 2 MG/1
2 TABLET ORAL 3 TIMES DAILY PRN
Status: DISCONTINUED | OUTPATIENT
Start: 2019-06-12 | End: 2019-06-12

## 2019-06-12 RX ORDER — ACETAMINOPHEN 500 MG
1000 TABLET ORAL ONCE
Status: DISCONTINUED | OUTPATIENT
Start: 2019-06-12 | End: 2019-06-12

## 2019-06-12 RX ORDER — OXYCODONE HYDROCHLORIDE 5 MG/1
5 TABLET ORAL EVERY 4 HOURS PRN
Status: DISCONTINUED | OUTPATIENT
Start: 2019-06-12 | End: 2019-06-12

## 2019-06-12 RX ORDER — ASPIRIN 325 MG
325 TABLET ORAL 2 TIMES DAILY
Status: DISCONTINUED | OUTPATIENT
Start: 2019-06-12 | End: 2019-06-13

## 2019-06-12 RX ORDER — ACETAMINOPHEN 325 MG/1
650 TABLET ORAL EVERY 4 HOURS PRN
Status: DISCONTINUED | OUTPATIENT
Start: 2019-06-12 | End: 2019-06-13

## 2019-06-12 RX ORDER — ONDANSETRON 2 MG/ML
INJECTION INTRAMUSCULAR; INTRAVENOUS
Status: COMPLETED
Start: 2019-06-12 | End: 2019-06-12

## 2019-06-12 RX ORDER — ZOLPIDEM TARTRATE 5 MG/1
5 TABLET ORAL NIGHTLY PRN
Status: DISCONTINUED | OUTPATIENT
Start: 2019-06-12 | End: 2019-06-12

## 2019-06-12 RX ORDER — HYDROMORPHONE HYDROCHLORIDE 1 MG/ML
0.8 INJECTION, SOLUTION INTRAMUSCULAR; INTRAVENOUS; SUBCUTANEOUS EVERY 2 HOUR PRN
Status: DISCONTINUED | OUTPATIENT
Start: 2019-06-12 | End: 2019-06-12

## 2019-06-12 RX ORDER — ALBUTEROL SULFATE 90 UG/1
2 AEROSOL, METERED RESPIRATORY (INHALATION) EVERY 4 HOURS PRN
Status: DISCONTINUED | OUTPATIENT
Start: 2019-06-12 | End: 2019-06-13

## 2019-06-12 NOTE — CONSULTS
[facility] Hospitalist Initial Consult       Reason for consult: Medical Management sp R TKA      History of Present Illness: Patient is a 68year old female with PMH sig for CDK3, asthma, remote DVT, and breast CA who presents sp R TKA.    She tolerated the proce stone    • Leg swelling 2015   • Osteoarthritis    • OSTEOPENIA    • Pain in joints    • PONV (postoperative nausea and vomiting)    • Renal disorder     CKD   • SEASONAL ALLERGIES    • Shingles 3/2011   • SINUSITIS    • Uncomfortable fullness after meals HISTORY      BREAST CANCER EXCISION W/RE-EXCISION & LUMPECTOMY [id number] LYMPH NODE BIOPSIES POSITIVE 2 OF 5 NODES APRIL 2006   • PART REMOVAL COLON W END COLOSTOMY      COLOSTOMY REVERSAL   • RADIATION LEFT  2016   • RADIATION RIGHT  2006   • Marry Apodaca dressing. Skin: no rashes or lesions  Neuro:  Grossly intact, no sensory deficits     Laboratory:  No results for input(s): WBC, HGB, MCV, PLT, BAND, INR in the last 168 hours.     Invalid input(s): LYM#, MONO#, BASOS#, EOSIN#    No results for input(s): N

## 2019-06-12 NOTE — INTERVAL H&P NOTE
Pre-op Diagnosis: Primary osteoarthritis of right knee [M17.11]    The above referenced H&P was reviewed by Shannan Carrasco MD on 6/12/2019, the patient was examined and no significant changes have occurred in the patient's condition since the H&P was per

## 2019-06-12 NOTE — OPERATIVE REPORT
DATE OF PROCEDURE:  6/12/2019  PREOPERATIVE DIAGNOSIS: Right knee osteoarthritis. POSTOPERATIVE DIAGNOSIS: Right knee osteoarthritis. PROCEDURE PERFORMED: Cemented right total knee arthroplasty. SURGEON:  Ruperto Oshea M.D.   FIRST ASSISTANT: Roni Ventura patella. Distal femoral condyle drill holes were made using the trial template. Final components the same size as the trials were utilized. Trial components were removed. Bony surfaces were irrigated and dried.  Final components were precoated with ceme

## 2019-06-12 NOTE — PHYSICAL THERAPY NOTE
PHYSICAL THERAPY KNEE EVALUATION - INPATIENT     Room Number: 359/359-A  Evaluation Date: 6/12/2019  Type of Evaluation: Initial  Physician Order: PT Eval and Treat    Presenting Problem: s/p right TKA 6/12/19  Reason for Therapy: Mobility Dysfunction and MAIN OR   • COLON RESECTION LOW ANTERIOR (SIGMOID) N/A 12/19/2015    Performed by Pretty Helms MD at [address] [place] [address] Dr   • COLONOSCOPY  12/1/2006    POLYPS, RPT 5 YRS   • COLONOSCOPY  09/17/13    Rory Michel (rpt 3 yrs)   • COLONOSCOPY N/A 4/26/2016    Performed by Lj use assistive device, lives with supportive spouse who will be home to assist as needed.      SUBJECTIVE  \"This feels good to get up and out of that bed\"    Patient self-stated goal is to go home    OBJECTIVE  Precautions: Limb alert - right(right UE limb CK    FUNCTIONAL ABILITY STATUS  Gait Assessment   Gait Assistance: Dependent assistance(actual min a)  Distance (ft): 50  Assistive Device: Rolling walker  Pattern: R Decreased stance time  Stoop/Curb Assistance: Not tested  Comment : above based on fim d clinical presentation is stable and overall the evaluation complexity is considered low. These impairments and comorbidities manifest themselves as functional limitations in independent bed mobility, transfers, and gait.   The patient is below baseline and

## 2019-06-12 NOTE — ANESTHESIA POSTPROCEDURE EVALUATION
[address] Patient Status:  Outpatient in a Bed   Age/Gender 68year old female MRN [id number]   [facility] SURGERY Attending Maureen Dc MD   Hosp Day # 0 PCP Justo Funes MD       Anesthesia Post-op Note

## 2019-06-12 NOTE — ANESTHESIA PREPROCEDURE EVALUATION
PRE-OP EVALUATION    Patient Name: Lazarus Gum    Pre-op Diagnosis: Primary osteoarthritis of right knee [M17.11]    Procedure(s):  RIGHT TOTAL KNEE REPLACEMENT    Surgeon(s) and Role:     Yana Miranda MD - Primary    Pre-op vitals reviewed Cholecalciferol (VITAMIN D) 2000 units Oral Cap Take 2,000 Units by mouth daily. Disp:  Rfl:    VALERIAN ROOT OR Take 1 tablet by mouth nightly.    Disp:  Rfl:    magnesium oxide (MAG-OX) 400 MG Oral Tab Take 1 tablet (400 mg total) by mouth 2 (two) indy ASPIRATION RIGHT     • CYSTOSCOPY STENT INSERTION Left 1/3/2016    Performed by Britney Romero MD at [address] [place] [address], [address] Left 12/12/2015    Performed by Karen Dietz MD at [facility] MAIN OR   • CYSTOSCOPY,URETEROSCOPY,LITHOTRIPSY Left 12/1 .0 06/04/2019     Lab Results   Component Value Date     06/04/2019    K 4.4 06/04/2019     06/04/2019    CO2 27.0 06/04/2019    BUN 22 (H) 06/04/2019    CREATSERUM 1.10 (H) 06/04/2019    GLU 96 06/04/2019    CA 9.3 06/04/2019     Lab

## 2019-06-12 NOTE — PLAN OF CARE
Pt arrived from PACU on bed. Family at bedside. VSS, BP slightly elevated. Rates pain \"mild\" but declines pain meds. States that pain meds cause a lot of nausea for her, willing to try ultram or low dose [place], paged pain service to notify.  Denies nausea

## 2019-06-13 VITALS
TEMPERATURE: 98 F | HEIGHT: 64 IN | OXYGEN SATURATION: 96 % | RESPIRATION RATE: 18 BRPM | HEART RATE: 75 BPM | WEIGHT: 179.88 LBS | BODY MASS INDEX: 30.71 KG/M2 | SYSTOLIC BLOOD PRESSURE: 145 MMHG | DIASTOLIC BLOOD PRESSURE: 55 MMHG

## 2019-06-13 PROCEDURE — 80048 BASIC METABOLIC PNL TOTAL CA: CPT | Performed by: INTERNAL MEDICINE

## 2019-06-13 PROCEDURE — 97535 SELF CARE MNGMENT TRAINING: CPT

## 2019-06-13 PROCEDURE — 97116 GAIT TRAINING THERAPY: CPT

## 2019-06-13 PROCEDURE — 97150 GROUP THERAPEUTIC PROCEDURES: CPT

## 2019-06-13 PROCEDURE — 85027 COMPLETE CBC AUTOMATED: CPT | Performed by: ORTHOPAEDIC SURGERY

## 2019-06-13 PROCEDURE — 97165 OT EVAL LOW COMPLEX 30 MIN: CPT

## 2019-06-13 RX ORDER — ONDANSETRON 4 MG/1
4 TABLET, FILM COATED ORAL EVERY 8 HOURS PRN
Qty: 20 TABLET | Refills: 2 | Status: SHIPPED | OUTPATIENT
Start: 2019-06-13 | End: 2019-06-27

## 2019-06-13 RX ORDER — ONDANSETRON 4 MG/1
4 TABLET, ORALLY DISINTEGRATING ORAL EVERY 6 HOURS PRN
Status: DISCONTINUED | OUTPATIENT
Start: 2019-06-13 | End: 2019-06-13

## 2019-06-13 RX ORDER — ONDANSETRON 2 MG/ML
4 INJECTION INTRAMUSCULAR; INTRAVENOUS EVERY 4 HOURS PRN
Status: DISCONTINUED | OUTPATIENT
Start: 2019-06-13 | End: 2019-06-13

## 2019-06-13 RX ORDER — METOCLOPRAMIDE HYDROCHLORIDE 5 MG/ML
5 INJECTION INTRAMUSCULAR; INTRAVENOUS EVERY 6 HOURS PRN
Status: DISCONTINUED | OUTPATIENT
Start: 2019-06-13 | End: 2019-06-13

## 2019-06-13 NOTE — DISCHARGE SUMMARY
General Medicine Discharge Summary     Patient ID:  Virginia Mayer  68year old  11/7/1945    Admit date: 6/12/2019    Discharge date and time: 6/13/19    Attending Physician: Randy Hook MD mg total) by mouth every 8 (eight) hours as needed for Nausea. HYDROcodone-acetaminophen (NORCO)  MG Oral Tab  Take 1-2 tablets by mouth every 4 (four) hours as needed.     aspirin 325 MG Oral Tab EC  Take 1 tablet (325 mg total) by mouth 2 (two) t

## 2019-06-13 NOTE — PROGRESS NOTES
[address] Patient Status:  Outpatient in a Bed    1945 MRN [id number]   [facility] 3SW-A Attending Aneta Candelaria MD   Hosp Day # 0 PCP Amy Sharma MD     Subjective:  S/P RIGHT Total Knee Arthroplasty

## 2019-06-13 NOTE — CM/SW NOTE
06/13/19 1524   Discharge disposition   Expected discharge disposition Home-Health   Name of Facillity/Home Care/Hospice Residential     / to remain available for any further discharge planning needs.     Berry Mauricio RN, Case

## 2019-06-13 NOTE — HOME CARE LIAISON
MET WITH PTNT AND OFFERED CHOICE  OF AGENCIES. PTNT AGREEABLE TO [facility]. MET WITH PTNT TO DISCUSS HOME HEALTH SERVICES AND COVERAGE CRITERIA. PTNT AGREEABLE TO [facility]. PTNT GIVEN RESIDENTIAL BROCHURE.  RESIDENTIAL WITH PROVIDE SN/PT ON DISC

## 2019-06-13 NOTE — PROGRESS NOTES
[facility] Pharmacy Note:  Renal Dose Adjustment for Metoclopramide (REGLAN)    Mariia Dixon has been prescribed Metoclopramide (REGLAN) 10 mg every 6 hours as needed for NV.     Estimated Creatinine Clearance: 35.4 mL/min (A) (based on SCr of 1.24 mg/dL (

## 2019-06-13 NOTE — PLAN OF CARE
A&Ox4. VSS. Nausea with pain medications. Pain well managed with PO pain medications and zofran. Voids without difficulty. Ace wrap CDI. Denies numbness and tingling to RLE. Plan is home with [place] today. Will continue to monitor.

## 2019-06-13 NOTE — CM/SW NOTE
06/13/19 0900   CM/SW Referral Data   Referral Source Social Work (self-referral); Physician   Reason for Referral Discharge planning   Informant Patient;[facility] Staff   Social History   Recreational Drug/Alcohol Use n   Major Changes Last 6 Months n   Do

## 2019-06-13 NOTE — OCCUPATIONAL THERAPY NOTE
OCCUPATIONAL THERAPY QUICK EVALUATION - INPATIENT    Room Number: 359/359-A  Evaluation Date: 6/13/2019     Type of Evaluation: Quick Eval  Presenting Problem: s/p R TKA 6/12/19    Physician Order: IP Consult to Occupational Therapy  Reason for Therapy:  A bag - will be reversed   • BREAST SENTINEL LYMPH NODE BIOPSY Left 5/9/2016    Performed by Gadiel Serra MD at [address]   • COLON RESECTION LOW ANTERIOR (SIGMOID) N/A 12/19/2015    Performed by Monika Goodrich MD at [facility] MAIN OR   • COLONOSCOPY  12/1/2006 toilet;Comfort height toilet; Toilet riser with arms  Shower/Tub and Equipment: Walk-in shower(borrowed shower chair)  Other Equipment: Reacher    Occupation/Status: Retired     Drives: Yes  Patient Regularly Uses: Glasses    Prior Level of Function: Yesenia Stand: Supervision    Skilled Therapy Provided:  Activities performed this session include: Education on knee/surgical precautions and incorporation into ADLs; patient required minimal cueing to follow throughout session; education on bed mobility, performe comorbidities nor modifications of tasks    Clinical Decision Making  LOW - Analysis of occupational profile, problem-focused assessments, limited treatment options    Overall Complexity  LOW     OT Discharge Recommendations: Home(with family assist)  OT D

## 2019-06-13 NOTE — PLAN OF CARE
Pt and spouse watched DC video. Discharge education completed with pt and spouse. All questions addressed. Script for [employer], Zofran and aspirin given to pt. Tramadol script shredded per Dr. John Cerda. All pt belongings packed and sent with pt.  Discharged h

## 2019-06-13 NOTE — PLAN OF CARE
Pt pain managed with Norco. Taking zofran as prophylaxis and tolerating pain meds without nausea. VSS. Ambulating well with walker. Voiding without difficulty. OK to DC from all services. Plan: home with HHPT today. Will monitor.

## 2019-07-01 PROBLEM — M25.661 DECREASED ROM OF RIGHT KNEE: Status: ACTIVE | Noted: 2019-07-01

## 2019-07-30 ENCOUNTER — LAB ENCOUNTER (OUTPATIENT)
Dept: LAB | Age: 74
End: 2019-07-30
Attending: FAMILY MEDICINE
Payer: MEDICARE

## 2019-07-30 DIAGNOSIS — D64.9 ANEMIA, UNSPECIFIED TYPE: ICD-10-CM

## 2019-07-30 LAB — HGB BLD-MCNC: 12.2 G/DL (ref 12–16)

## 2019-07-30 PROCEDURE — 85018 HEMOGLOBIN: CPT

## 2019-07-30 PROCEDURE — 36415 COLL VENOUS BLD VENIPUNCTURE: CPT

## 2019-07-31 NOTE — PROGRESS NOTES
Tiera  The hemoglobin is back to normal.  You can stop iron if you are taking it and no further testing is needed.    Amy Sharma MD

## 2019-08-23 ENCOUNTER — APPOINTMENT (OUTPATIENT)
Dept: HEMATOLOGY/ONCOLOGY | Age: 74
End: 2019-08-23
Attending: INTERNAL MEDICINE
Payer: MEDICARE

## 2019-08-26 ENCOUNTER — OFFICE VISIT (OUTPATIENT)
Dept: HEMATOLOGY/ONCOLOGY | Facility: HOSPITAL | Age: 74
End: 2019-08-26
Attending: INTERNAL MEDICINE
Payer: MEDICARE

## 2019-08-26 VITALS
OXYGEN SATURATION: 99 % | WEIGHT: 175 LBS | TEMPERATURE: 98 F | BODY MASS INDEX: 30 KG/M2 | SYSTOLIC BLOOD PRESSURE: 149 MMHG | HEART RATE: 96 BPM | DIASTOLIC BLOOD PRESSURE: 77 MMHG | RESPIRATION RATE: 18 BRPM

## 2019-08-26 DIAGNOSIS — Z17.1 MALIGNANT NEOPLASM OF OVERLAPPING SITES OF LEFT BREAST IN FEMALE, ESTROGEN RECEPTOR NEGATIVE (HCC): Primary | ICD-10-CM

## 2019-08-26 DIAGNOSIS — C50.812 MALIGNANT NEOPLASM OF OVERLAPPING SITES OF LEFT BREAST IN FEMALE, ESTROGEN RECEPTOR NEGATIVE (HCC): Primary | ICD-10-CM

## 2019-08-26 PROCEDURE — 99213 OFFICE O/P EST LOW 20 MIN: CPT | Performed by: INTERNAL MEDICINE

## 2019-08-26 NOTE — PROGRESS NOTES
Cancer Center Progress Note    Problem List:      Patient Active Problem List:     Breast cancer, right breast ([facility] [address]     Colon polyps     Asthma     Vitamin D deficiency     Osteoarthrosis, unspecified whether generalized or localized, lower leg     Kidney easy bruising, bleeding, and lymphadenopathy. Musculoskeletal: Negative for myalgias, arthralgias, muscle weakness.   Genitourinary: Negative for dysuria or hematuria  Neurological: Negative for headaches, dizziness, speech problems, gait problems and foca COLONOSCOPY  12/1/2006    POLYPS, RPT 5 YRS   • COLONOSCOPY  09/17/13    Judith Santana (rpt 3 yrs)   • COLONOSCOPY N/A 4/26/2016    Performed by Dwayne Phillips MD at [facility] ENDOSCOPY   • CYST ASPIRATION LEFT     • CYST ASPIRATION RIGHT     • Yesenia Colon Cancer Paternal Grandfather         80   • Diabetes Paternal Grandmother    • Heart Disorder Paternal Grandmother    • Breast Cancer Self 61   • Breast Cancer Paternal Aunt 48        estimate   • Other (Other) Sister         lupus   • Breast Cancer P Examination:    Constitutional: Patient is alert and oriented x 3, not in acute distress. Eyes: Anicteric sclera, pink conjunctiva. HEENT:  Oropharynx is clear. Neck is supple. Respiratory: Clear to auscultation and percussion. No rales. No wheezes.   C BIRADS Code 2: BENIGN FINDING. RECOMMENDATIONS:    FOLLOW-UP: Routine screening follow-up recommended.        Assessment/Plan:     Invasive ductal carcinoma of the left breast in overlapping quadrants (3 o'clock):  ER 0%  AR 0%  Her2 not amplified

## 2019-11-01 ENCOUNTER — HOSPITAL ENCOUNTER (OUTPATIENT)
Dept: MAMMOGRAPHY | Age: 74
Discharge: HOME | End: 2019-11-01
Attending: INTERNAL MEDICINE
Payer: MEDICARE

## 2019-11-01 DIAGNOSIS — C50.812 MALIGNANT NEOPLASM OF OVERLAPPING SITES OF LEFT BREAST IN FEMALE, ESTROGEN RECEPTOR NEGATIVE (HCC): ICD-10-CM

## 2019-11-01 DIAGNOSIS — Z17.1 MALIGNANT NEOPLASM OF OVERLAPPING SITES OF LEFT BREAST IN FEMALE, ESTROGEN RECEPTOR NEGATIVE (HCC): ICD-10-CM

## 2019-11-01 PROCEDURE — 77066 DX MAMMO INCL CAD BI: CPT | Performed by: INTERNAL MEDICINE

## 2019-11-01 PROCEDURE — 77062 BREAST TOMOSYNTHESIS BI: CPT | Performed by: INTERNAL MEDICINE

## 2019-11-15 ENCOUNTER — OFFICE VISIT (OUTPATIENT)
Dept: HEMATOLOGY/ONCOLOGY | Age: 74
End: 2019-11-15
Attending: INTERNAL MEDICINE
Payer: MEDICARE

## 2019-11-15 VITALS
HEART RATE: 93 BPM | BODY MASS INDEX: 28 KG/M2 | SYSTOLIC BLOOD PRESSURE: 152 MMHG | WEIGHT: 172.19 LBS | TEMPERATURE: 97 F | OXYGEN SATURATION: 94 % | DIASTOLIC BLOOD PRESSURE: 78 MMHG | RESPIRATION RATE: 18 BRPM

## 2019-11-15 DIAGNOSIS — Z17.1 MALIGNANT NEOPLASM OF OVERLAPPING SITES OF LEFT BREAST IN FEMALE, ESTROGEN RECEPTOR NEGATIVE (HCC): Primary | ICD-10-CM

## 2019-11-15 DIAGNOSIS — C50.812 MALIGNANT NEOPLASM OF OVERLAPPING SITES OF LEFT BREAST IN FEMALE, ESTROGEN RECEPTOR NEGATIVE (HCC): Primary | ICD-10-CM

## 2019-11-15 PROCEDURE — 99213 OFFICE O/P EST LOW 20 MIN: CPT | Performed by: INTERNAL MEDICINE

## 2019-11-15 RX ORDER — FAMOTIDINE 20 MG/1
20 TABLET ORAL 2 TIMES DAILY
COMMUNITY
End: 2020-03-23 | Stop reason: ALTCHOICE

## 2019-11-15 NOTE — PROGRESS NOTES
Cancer Center Progress Note    Problem List:      Patient Active Problem List:     Breast cancer, right breast ([facility] [address]     Colon polyps     Asthma     Vitamin D deficiency     Osteoarthrosis, unspecified whether generalized or localized, lower leg     Kidney weakness. Genitourinary: Negative for dysuria or hematuria  Neurological: Negative for headaches, dizziness, speech problems, gait problems and focal weakness. Psychiatric: The patient's mood was calm and appropriate for this visit.   The pertinent positi COLONOSCOPY  09/17/13    Makayla Meza (rpt 3 yrs)   • COLONOSCOPY N/A 4/26/2016    Performed by Tg Glaser MD at [facility] ENDOSCOPY   • CYST ASPIRATION LEFT     • CYST ASPIRATION RIGHT     • [place] Left 1/3/2016    Performed by Asha Day Grandfather         80   • Diabetes Paternal Grandmother    • Heart Disorder Paternal Grandmother    • Breast Cancer Self 61   • Breast Cancer Paternal Aunt 48        estimate   • Other (Other) Sister         lupus   • Breast Cancer Paternal Aunt         7 clear. Neck is supple. Respiratory: Clear to auscultation and percussion. No rales. No wheezes. Cardiovascular: Regular rate and rhythm. No murmurs. Breast: The right breast has no mass, nipple d/c or retraction. The left breast has no mass.   The [employer] carcinoma of the left breast in overlapping quadrants (3 o'clock):  ER 0%  TX 0%  Her2 not amplified  Ki-67 22%  Lumpectomy on 5/9/2016  0.9 cm cancer  0/2 SLNs  Left breast radiation completed on 8/18/2016     She has ROE.  I will continue to see her at si

## 2020-01-21 ENCOUNTER — LAB ENCOUNTER (OUTPATIENT)
Dept: LAB | Age: 75
End: 2020-01-21
Attending: INTERNAL MEDICINE
Payer: MEDICARE

## 2020-01-21 DIAGNOSIS — N18.30 CKD (CHRONIC KIDNEY DISEASE) STAGE 3, GFR 30-59 ML/MIN (HCC): ICD-10-CM

## 2020-01-21 LAB
ANION GAP SERPL CALC-SCNC: 5 MMOL/L (ref 0–18)
BASOPHILS # BLD AUTO: 0.02 X10(3) UL (ref 0–0.2)
BASOPHILS NFR BLD AUTO: 0.3 %
BUN BLD-MCNC: 23 MG/DL (ref 7–18)
BUN/CREAT SERPL: 19.2 (ref 10–20)
CALCIUM BLD-MCNC: 9.1 MG/DL (ref 8.5–10.1)
CHLORIDE SERPL-SCNC: 106 MMOL/L (ref 98–112)
CO2 SERPL-SCNC: 29 MMOL/L (ref 21–32)
CREAT BLD-MCNC: 1.2 MG/DL (ref 0.55–1.02)
DEPRECATED RDW RBC AUTO: 44.7 FL (ref 35.1–46.3)
EOSINOPHIL # BLD AUTO: 0.16 X10(3) UL (ref 0–0.7)
EOSINOPHIL NFR BLD AUTO: 2.5 %
ERYTHROCYTE [DISTWIDTH] IN BLOOD BY AUTOMATED COUNT: 13.7 % (ref 11–15)
GLUCOSE BLD-MCNC: 121 MG/DL (ref 70–99)
HAV IGM SER QL: 2.1 MG/DL (ref 1.6–2.6)
HCT VFR BLD AUTO: 43.6 % (ref 35–48)
HGB BLD-MCNC: 13.4 G/DL (ref 12–16)
IMM GRANULOCYTES # BLD AUTO: 0.02 X10(3) UL (ref 0–1)
IMM GRANULOCYTES NFR BLD: 0.3 %
LYMPHOCYTES # BLD AUTO: 1.74 X10(3) UL (ref 1–4)
LYMPHOCYTES NFR BLD AUTO: 27.7 %
MCH RBC QN AUTO: 27.5 PG (ref 26–34)
MCHC RBC AUTO-ENTMCNC: 30.7 G/DL (ref 31–37)
MCV RBC AUTO: 89.3 FL (ref 80–100)
MONOCYTES # BLD AUTO: 0.66 X10(3) UL (ref 0.1–1)
MONOCYTES NFR BLD AUTO: 10.5 %
NEUTROPHILS # BLD AUTO: 3.69 X10 (3) UL (ref 1.5–7.7)
NEUTROPHILS # BLD AUTO: 3.69 X10(3) UL (ref 1.5–7.7)
NEUTROPHILS NFR BLD AUTO: 58.7 %
OSMOLALITY SERPL CALC.SUM OF ELEC: 295 MOSM/KG (ref 275–295)
PATIENT FASTING Y/N/NP: YES
PHOSPHATE SERPL-MCNC: 3.1 MG/DL (ref 2.5–4.9)
PLATELET # BLD AUTO: 191 10(3)UL (ref 150–450)
POTASSIUM SERPL-SCNC: 4.7 MMOL/L (ref 3.5–5.1)
RBC # BLD AUTO: 4.88 X10(6)UL (ref 3.8–5.3)
SODIUM SERPL-SCNC: 140 MMOL/L (ref 136–145)
URATE SERPL-MCNC: 6.8 MG/DL (ref 2.6–6)
VIT D+METAB SERPL-MCNC: 44.9 NG/ML (ref 30–100)
WBC # BLD AUTO: 6.3 X10(3) UL (ref 4–11)

## 2020-01-21 PROCEDURE — 84550 ASSAY OF BLOOD/URIC ACID: CPT

## 2020-01-21 PROCEDURE — 82306 VITAMIN D 25 HYDROXY: CPT

## 2020-01-21 PROCEDURE — 83735 ASSAY OF MAGNESIUM: CPT

## 2020-01-21 PROCEDURE — 85025 COMPLETE CBC W/AUTO DIFF WBC: CPT

## 2020-01-21 PROCEDURE — 84100 ASSAY OF PHOSPHORUS: CPT

## 2020-01-21 PROCEDURE — 36415 COLL VENOUS BLD VENIPUNCTURE: CPT

## 2020-01-21 PROCEDURE — 80048 BASIC METABOLIC PNL TOTAL CA: CPT

## 2020-01-28 ENCOUNTER — OFFICE VISIT (OUTPATIENT)
Dept: NEPHROLOGY | Facility: CLINIC | Age: 75
End: 2020-01-28
Payer: MEDICARE

## 2020-01-28 VITALS — DIASTOLIC BLOOD PRESSURE: 86 MMHG | BODY MASS INDEX: 28 KG/M2 | WEIGHT: 175 LBS | SYSTOLIC BLOOD PRESSURE: 148 MMHG

## 2020-01-28 DIAGNOSIS — N18.30 STAGE 3 CHRONIC KIDNEY DISEASE (HCC): Primary | ICD-10-CM

## 2020-01-28 PROCEDURE — 99213 OFFICE O/P EST LOW 20 MIN: CPT | Performed by: INTERNAL MEDICINE

## 2020-01-28 RX ORDER — FUROSEMIDE 20 MG/1
20 TABLET ORAL 2 TIMES DAILY
Qty: 180 TABLET | Refills: 1 | Status: SHIPPED | OUTPATIENT
Start: 2020-01-28 | End: 2020-07-27

## 2020-01-28 NOTE — PROGRESS NOTES
Nephrology Progress Note      Brijesh Miller is a 69year old female. HPI:   Patient presents with:  Chronic Kidney Disease    10year old female with hx of breast ca, nephrolithasis, CKD - stage III here for follow up.   Patient's kidney stone was Supple, no LLUVIA or thyromegaly  Cardiac: Regular rate and rhythm, S1, S2 normal, no murmur or rub  Lungs: Clear without wheezes, rales, rhonchi.     Abdomen: Soft, non-tender. + bowel sounds, no palpable organomegaly  Extremities: Without clubbing, cyanosis; 44.7   Prelim Neutrophil Abs Latest Ref Range: 1.50 - 7.70 x10 (3) uL 3.69   Neutrophils Absolute Latest Ref Range: 1.50 - 7.70 x10(3) uL 3.69   Lymphocytes Absolute Latest Ref Range: 1.00 - 4.00 x10(3) uL 1.74   Monocytes Absolute Latest Ref Range: 0.10 -

## 2020-05-15 ENCOUNTER — APPOINTMENT (OUTPATIENT)
Dept: HEMATOLOGY/ONCOLOGY | Age: 75
End: 2020-05-15
Payer: MEDICARE

## 2020-05-15 ENCOUNTER — OFFICE VISIT (OUTPATIENT)
Dept: HEMATOLOGY/ONCOLOGY | Facility: HOSPITAL | Age: 75
End: 2020-05-15
Attending: INTERNAL MEDICINE
Payer: MEDICARE

## 2020-05-15 VITALS
SYSTOLIC BLOOD PRESSURE: 141 MMHG | RESPIRATION RATE: 18 BRPM | OXYGEN SATURATION: 99 % | BODY MASS INDEX: 30 KG/M2 | HEART RATE: 69 BPM | DIASTOLIC BLOOD PRESSURE: 75 MMHG | WEIGHT: 183.19 LBS | TEMPERATURE: 97 F

## 2020-05-15 DIAGNOSIS — C50.812 MALIGNANT NEOPLASM OF OVERLAPPING SITES OF LEFT BREAST IN FEMALE, ESTROGEN RECEPTOR NEGATIVE (HCC): Primary | ICD-10-CM

## 2020-05-15 DIAGNOSIS — Z17.1 MALIGNANT NEOPLASM OF OVERLAPPING SITES OF LEFT BREAST IN FEMALE, ESTROGEN RECEPTOR NEGATIVE (HCC): Primary | ICD-10-CM

## 2020-05-15 DIAGNOSIS — N18.30 CKD (CHRONIC KIDNEY DISEASE) STAGE 3, GFR 30-59 ML/MIN (HCC): ICD-10-CM

## 2020-05-15 PROCEDURE — 99213 OFFICE O/P EST LOW 20 MIN: CPT | Performed by: INTERNAL MEDICINE

## 2020-05-15 NOTE — PROGRESS NOTES
Cancer Center Progress Note    Problem List:      Patient Active Problem List:     Breast cancer, right breast ([facility] [address]     Colon polyps     Asthma     Vitamin D deficiency     Osteoarthrosis, unspecified whether generalized or localized, lower leg     Kidney weakness. Genitourinary: Negative for dysuria or hematuria  Neurological: Negative for headaches, dizziness, speech problems, gait problems and focal weakness. Psychiatric: The patient's mood was calm and appropriate for this visit.   The pertinent positi COLONOSCOPY  09/17/13    Julito Nunn (rpt 3 yrs)   • COLONOSCOPY N/A 4/26/2016    Performed by Marcell Wills MD at [facility] ENDOSCOPY   • CYST ASPIRATION LEFT     • CYST ASPIRATION RIGHT     • [place] Left 1/3/2016    Performed by Lilly Skelton Grandfather         80   • Diabetes Paternal Grandmother    • Heart Disorder Paternal Grandmother    • Breast Cancer Self 61   • Breast Cancer Paternal Aunt 48        estimate   • Other (Other) Sister         lupus   • Breast Cancer Paternal Aunt         7 Examination:    Constitutional: Patient is alert and oriented x 3, not in acute distress. Eyes: Anicteric sclera, pink conjunctiva. HEENT:  Oropharynx is clear. Neck is supple. Respiratory: Clear to auscultation and percussion. No rales. No wheezes.   C BIRADS Code 2: BENIGN FINDING. RECOMMENDATIONS:    FOLLOW-UP: Routine screening follow-up recommended.        Assessment/Plan:     Invasive ductal carcinoma of the left breast in overlapping quadrants (3 o'clock):  ER 0%  CT 0%  Her2 not amplified

## 2020-07-27 RX ORDER — FUROSEMIDE 20 MG/1
20 TABLET ORAL 2 TIMES DAILY
Qty: 180 TABLET | Refills: 1 | Status: SHIPPED | OUTPATIENT
Start: 2020-07-27 | End: 2021-01-18

## 2020-11-03 ENCOUNTER — HOSPITAL ENCOUNTER (OUTPATIENT)
Dept: MAMMOGRAPHY | Age: 75
Discharge: HOME | End: 2020-11-03
Attending: INTERNAL MEDICINE
Payer: MEDICARE

## 2020-11-03 ENCOUNTER — HOSPITAL ENCOUNTER (OUTPATIENT)
Dept: ULTRASOUND IMAGING | Age: 75
Discharge: HOME | End: 2020-11-03
Attending: INTERNAL MEDICINE
Payer: MEDICARE

## 2020-11-03 DIAGNOSIS — C50.812 MALIGNANT NEOPLASM OF OVERLAPPING SITES OF LEFT BREAST IN FEMALE, ESTROGEN RECEPTOR NEGATIVE (HCC): ICD-10-CM

## 2020-11-03 DIAGNOSIS — Z17.1 MALIGNANT NEOPLASM OF OVERLAPPING SITES OF LEFT BREAST IN FEMALE, ESTROGEN RECEPTOR NEGATIVE (HCC): ICD-10-CM

## 2020-11-03 PROCEDURE — 77062 BREAST TOMOSYNTHESIS BI: CPT | Performed by: INTERNAL MEDICINE

## 2020-11-03 PROCEDURE — 77066 DX MAMMO INCL CAD BI: CPT | Performed by: INTERNAL MEDICINE

## 2020-11-03 PROCEDURE — 76642 ULTRASOUND BREAST LIMITED: CPT | Performed by: INTERNAL MEDICINE

## 2020-11-03 NOTE — IMAGING NOTE
Called pt with pre procedure instructions to eat light meal, take acetaminophen, and bring sports bra. No anticoags, bleeding disorders, liver disease or chemo. Pt verbalized understanding of all instructions.

## 2020-11-05 ENCOUNTER — TELEPHONE (OUTPATIENT)
Dept: HEMATOLOGY/ONCOLOGY | Facility: HOSPITAL | Age: 75
End: 2020-11-05

## 2020-11-05 DIAGNOSIS — C50.812 MALIGNANT NEOPLASM OF OVERLAPPING SITES OF LEFT BREAST IN FEMALE, ESTROGEN RECEPTOR NEGATIVE (HCC): Primary | ICD-10-CM

## 2020-11-05 DIAGNOSIS — R92.8 ABNORMAL MAMMOGRAM OF LEFT BREAST: ICD-10-CM

## 2020-11-05 DIAGNOSIS — Z17.1 MALIGNANT NEOPLASM OF OVERLAPPING SITES OF LEFT BREAST IN FEMALE, ESTROGEN RECEPTOR NEGATIVE (HCC): Primary | ICD-10-CM

## 2020-11-05 NOTE — TELEPHONE ENCOUNTER
Returned her call and let her know Dr Jonathan Jovel placed the order for the US guided biopsy of left breast.   I will have someone call her to schedule.

## 2020-11-05 NOTE — TELEPHONE ENCOUNTER
Kandy Harris from the [facility] department called and stated that Marva Tang called to set up an appointment. She stated that they had not received an order yet and asked that one be placed. She said feel free to reach out with any questions.

## 2020-11-05 NOTE — TELEPHONE ENCOUNTER
Nae Sommers called, she had spoken to Radiology and they have said they have not heard back from Dr Dottie Elizalde regarding a [address] that is needed due to something on Mammogram.  Could there be follow up and a call to patient on the status of this request.

## 2020-11-09 ENCOUNTER — APPOINTMENT (OUTPATIENT)
Dept: HEMATOLOGY/ONCOLOGY | Facility: HOSPITAL | Age: 75
End: 2020-11-09
Attending: INTERNAL MEDICINE
Payer: MEDICARE

## 2020-11-10 ENCOUNTER — HOSPITAL ENCOUNTER (OUTPATIENT)
Dept: MAMMOGRAPHY | Facility: HOSPITAL | Age: 75
Discharge: HOME | End: 2020-11-10
Attending: INTERNAL MEDICINE
Payer: MEDICARE

## 2020-11-10 DIAGNOSIS — Z17.1 MALIGNANT NEOPLASM OF OVERLAPPING SITES OF LEFT BREAST IN FEMALE, ESTROGEN RECEPTOR NEGATIVE (HCC): ICD-10-CM

## 2020-11-10 DIAGNOSIS — R92.8 ABNORMAL MAMMOGRAM OF LEFT BREAST: ICD-10-CM

## 2020-11-10 DIAGNOSIS — C50.812 MALIGNANT NEOPLASM OF OVERLAPPING SITES OF LEFT BREAST IN FEMALE, ESTROGEN RECEPTOR NEGATIVE (HCC): ICD-10-CM

## 2020-11-10 PROCEDURE — 88305 TISSUE EXAM BY PATHOLOGIST: CPT | Performed by: INTERNAL MEDICINE

## 2020-11-10 PROCEDURE — 88360 TUMOR IMMUNOHISTOCHEM/MANUAL: CPT | Performed by: INTERNAL MEDICINE

## 2020-11-10 PROCEDURE — 19083 BX BREAST 1ST LESION US IMAG: CPT | Performed by: INTERNAL MEDICINE

## 2020-11-10 PROCEDURE — 88342 IMHCHEM/IMCYTCHM 1ST ANTB: CPT | Performed by: INTERNAL MEDICINE

## 2020-11-10 PROCEDURE — 77065 DX MAMMO INCL CAD UNI: CPT | Performed by: INTERNAL MEDICINE

## 2020-11-10 PROCEDURE — 88344 IMHCHEM/IMCYTCHM EA MLT ANTB: CPT | Performed by: INTERNAL MEDICINE

## 2020-11-16 ENCOUNTER — OFFICE VISIT (OUTPATIENT)
Dept: HEMATOLOGY/ONCOLOGY | Facility: HOSPITAL | Age: 75
End: 2020-11-16
Attending: INTERNAL MEDICINE
Payer: MEDICARE

## 2020-11-16 VITALS
TEMPERATURE: 97 F | BODY MASS INDEX: 29 KG/M2 | DIASTOLIC BLOOD PRESSURE: 79 MMHG | HEART RATE: 77 BPM | RESPIRATION RATE: 18 BRPM | SYSTOLIC BLOOD PRESSURE: 178 MMHG | OXYGEN SATURATION: 97 % | WEIGHT: 182 LBS

## 2020-11-16 DIAGNOSIS — C50.812 MALIGNANT NEOPLASM OF OVERLAPPING SITES OF LEFT BREAST IN FEMALE, ESTROGEN RECEPTOR NEGATIVE (HCC): ICD-10-CM

## 2020-11-16 DIAGNOSIS — Z17.1 MALIGNANT NEOPLASM OF OVERLAPPING SITES OF LEFT BREAST IN FEMALE, ESTROGEN RECEPTOR NEGATIVE (HCC): ICD-10-CM

## 2020-11-16 DIAGNOSIS — C50.911 MALIGNANT NEOPLASM OF RIGHT BREAST IN FEMALE, ESTROGEN RECEPTOR POSITIVE, UNSPECIFIED SITE OF BREAST (HCC): ICD-10-CM

## 2020-11-16 DIAGNOSIS — Z17.0 MALIGNANT NEOPLASM OF LOWER-INNER QUADRANT OF LEFT BREAST IN FEMALE, ESTROGEN RECEPTOR POSITIVE (HCC): Primary | ICD-10-CM

## 2020-11-16 DIAGNOSIS — Z17.0 MALIGNANT NEOPLASM OF RIGHT BREAST IN FEMALE, ESTROGEN RECEPTOR POSITIVE, UNSPECIFIED SITE OF BREAST (HCC): ICD-10-CM

## 2020-11-16 DIAGNOSIS — C50.312 MALIGNANT NEOPLASM OF LOWER-INNER QUADRANT OF LEFT BREAST IN FEMALE, ESTROGEN RECEPTOR POSITIVE (HCC): Primary | ICD-10-CM

## 2020-11-16 DIAGNOSIS — N18.31 STAGE 3A CHRONIC KIDNEY DISEASE (HCC): ICD-10-CM

## 2020-11-16 PROCEDURE — 99214 OFFICE O/P EST MOD 30 MIN: CPT | Performed by: INTERNAL MEDICINE

## 2020-11-17 NOTE — PROGRESS NOTES
Cancer Center Progress Note    Problem List:      Patient Active Problem List:     Breast cancer, right breast ([facility] [address]     Colon polyps     Asthma     Vitamin D deficiency     Osteoarthrosis, unspecified whether generalized or localized, lower leg     Kidney for rash, skin lesions, and pruritus. Hematologic/lymphatic: Negative for easy bruising, bleeding, and lymphadenopathy. Musculoskeletal: Negative for myalgias, arthralgias, muscle weakness.   Genitourinary: Negative for dysuria or hematuria  Neurological: 4/26/2016    Performed by Minnie Eason MD at [facility] ENDOSCOPY   • CYST ASPIRATION LEFT     • CYST ASPIRATION RIGHT     • CYSTOSCOPY STENT INSERTION Left 1/3/2016    Performed by Apple Torres MD at [facility] MAIN OR   • [place] Left 12/12/2015 Heart Disorder Paternal Grandmother    • Breast Cancer Self 61   • Breast Cancer Paternal Aunt 48        estimate   • Other (Other) Sister         lupus   • Breast Cancer Paternal Aunt         76   • Stroke Maternal Aunt         79       Allergies:       O not in acute distress. Eyes: Anicteric sclera, pink conjunctiva. HEENT:  Oropharynx is clear. Neck is supple. Respiratory: Clear to auscultation and percussion. No rales. No wheezes. Cardiovascular: Regular rate and rhythm. No murmurs.   Breast: The ri FINDING. RECOMMENDATIONS:    FOLLOW-UP: Routine screening follow-up recommended.        Assessment/Plan:     New left breast cancer in the lower inner breast:  Grade I  ER/%  Her2 negative    The patient has h/o bilateral lumpectomy and bilatera

## 2020-11-24 ENCOUNTER — NURSE NAVIGATOR ENCOUNTER (OUTPATIENT)
Dept: HEMATOLOGY/ONCOLOGY | Facility: HOSPITAL | Age: 75
End: 2020-11-24

## 2020-11-24 NOTE — PROGRESS NOTES
Phoned patient to discuss plan of care. Introduced myself as the breast RN navigator and how I can assist in the coordination of her care. Pt has met with Dr. Lizett Youngblood regarding diagnosis.  She did see Dr. Indio Hsu today and has appointment next week with Dr. Memo Montalvo

## 2020-12-01 ENCOUNTER — OFFICE VISIT (OUTPATIENT)
Dept: SURGERY | Facility: CLINIC | Age: 75
End: 2020-12-01
Payer: MEDICARE

## 2020-12-01 VITALS
BODY MASS INDEX: 30.35 KG/M2 | RESPIRATION RATE: 16 BRPM | WEIGHT: 184.38 LBS | HEART RATE: 80 BPM | HEIGHT: 65.5 IN | DIASTOLIC BLOOD PRESSURE: 76 MMHG | OXYGEN SATURATION: 96 % | SYSTOLIC BLOOD PRESSURE: 187 MMHG

## 2020-12-01 DIAGNOSIS — Z17.1 MALIGNANT NEOPLASM OF OVERLAPPING SITES OF LEFT BREAST IN FEMALE, ESTROGEN RECEPTOR NEGATIVE (HCC): ICD-10-CM

## 2020-12-01 DIAGNOSIS — C50.812 MALIGNANT NEOPLASM OF OVERLAPPING SITES OF LEFT BREAST IN FEMALE, ESTROGEN RECEPTOR NEGATIVE (HCC): ICD-10-CM

## 2020-12-01 DIAGNOSIS — Z01.818 PRE-OP EXAM: Primary | ICD-10-CM

## 2020-12-01 PROCEDURE — 99203 OFFICE O/P NEW LOW 30 MIN: CPT | Performed by: SURGERY

## 2020-12-01 NOTE — CONSULTS
New Patient Consultation    This is the first visit for this 77year old female who presents to discuss reconstructive options following surgery for breast cancer. History of Present Illness:    The patient is a 77year old female who presents with a lef History:   Procedure Laterality Date   • BOWEL RESECTION  December, 2015    ostomy bag - will be reversed   • BREAST SENTINEL LYMPH NODE BIOPSY Left 5/9/2016    Performed by Georgina Zuluaga MD at [facility] MAIN OR   • COLON RESECTION LOW ANTERIOR (SIGMOID) N/A 12 FNA LYMPH NODE, GUIDE INCLD,  LEFT (CPT=10005)           Medications:      •  PROPRANOLOL HCL 10 MG Oral Tab, TAKE 1 TABLET BY MOUTH THREE TIMES A DAY AS NEEDED, Disp: 270 tablet, Rfl: 0    •  furosemide 20 MG Oral Tab, Take 1 tablet (20 mg total) by mouth Comment: 10 cigarettes per day         Drug use: No           Review of Systems:    General:   The patient denies, fever, chills, night sweats, fatigue, generalized weakness, change in appetite, weight loss, or weight gain. Endocrine:   There is no his with intercourse, painful menses, or pregnancy. Musculoskeletal:  The patient denies muscle aches/pain, joint pain, stiff joints, neck pain, back pain or bone pain.     Neurologic:  There is no history of migraines or severe headaches, seizure/epilepsy, noted.    Lymph Nodes: There is no cervical, supraclavicular, or axillary lymphadenopathy appreciated. Back: There is no vertebral column tenderness. Skin: The skin appears normal. There are no suspicious appearing rashes or lesions.     Extremities:

## 2020-12-07 ENCOUNTER — NURSE NAVIGATOR ENCOUNTER (OUTPATIENT)
Dept: HEMATOLOGY/ONCOLOGY | Facility: HOSPITAL | Age: 75
End: 2020-12-07

## 2020-12-07 NOTE — PROGRESS NOTES
This breast RN navigator called pt back when she left  regarding \"Dr. Lillina Mccartney told me that I am not a candidate for reconstruction surgery, and does Dr. James Betancur know and what do I do from here? \".   Instructed pt that Dr. James Betancur will call her tomorrow re

## 2020-12-28 RX ORDER — HEPARIN SODIUM 5000 [USP'U]/ML
5000 INJECTION, SOLUTION INTRAVENOUS; SUBCUTANEOUS ONCE
Status: CANCELLED | OUTPATIENT
Start: 2020-12-28 | End: 2020-12-28

## 2020-12-31 ENCOUNTER — TELEPHONE (OUTPATIENT)
Dept: MAMMOGRAPHY | Facility: HOSPITAL | Age: 75
End: 2020-12-31

## 2020-12-31 NOTE — TELEPHONE ENCOUNTER
Phoned Johanna Pedro regarding needle localization process of breast for lumpectomy scheduled for 1-4-21 with Dr. James Betancur. Procedure explained and all questions answered. Pt to be transported via W/C through [facility] to [facility] in [place].  Pt verbaliz

## 2021-01-02 ENCOUNTER — LAB ENCOUNTER (OUTPATIENT)
Dept: LAB | Age: 76
End: 2021-01-02
Attending: SURGERY
Payer: MEDICARE

## 2021-01-02 DIAGNOSIS — C50.012 MALIGNANT NEOPLASM OF AREOLA OF LEFT BREAST IN FEMALE (HCC): ICD-10-CM

## 2021-01-02 LAB
ANION GAP SERPL CALC-SCNC: 6 MMOL/L (ref 0–18)
BUN BLD-MCNC: 25 MG/DL (ref 7–18)
BUN/CREAT SERPL: 25.8 (ref 10–20)
CALCIUM BLD-MCNC: 9.7 MG/DL (ref 8.5–10.1)
CHLORIDE SERPL-SCNC: 105 MMOL/L (ref 98–112)
CO2 SERPL-SCNC: 29 MMOL/L (ref 21–32)
CREAT BLD-MCNC: 0.97 MG/DL
GLUCOSE BLD-MCNC: 101 MG/DL (ref 70–99)
OSMOLALITY SERPL CALC.SUM OF ELEC: 295 MOSM/KG (ref 275–295)
PATIENT FASTING Y/N/NP: YES
POTASSIUM SERPL-SCNC: 4.4 MMOL/L (ref 3.5–5.1)
SODIUM SERPL-SCNC: 140 MMOL/L (ref 136–145)

## 2021-01-02 PROCEDURE — 80048 BASIC METABOLIC PNL TOTAL CA: CPT

## 2021-01-02 PROCEDURE — 36415 COLL VENOUS BLD VENIPUNCTURE: CPT

## 2021-01-03 ENCOUNTER — ANESTHESIA EVENT (OUTPATIENT)
Dept: SURGERY | Facility: HOSPITAL | Age: 76
End: 2021-01-03
Payer: MEDICARE

## 2021-01-03 LAB — SARS-COV-2 RNA RESP QL NAA+PROBE: NOT DETECTED

## 2021-01-04 ENCOUNTER — HOSPITAL ENCOUNTER (OUTPATIENT)
Dept: MAMMOGRAPHY | Facility: HOSPITAL | Age: 76
Discharge: HOME | End: 2021-01-04
Attending: SURGERY
Payer: MEDICARE

## 2021-01-04 ENCOUNTER — HOSPITAL ENCOUNTER (OUTPATIENT)
Facility: HOSPITAL | Age: 76
Setting detail: HOSPITAL OUTPATIENT SURGERY
Discharge: HOME | End: 2021-01-04
Attending: SURGERY | Admitting: SURGERY
Payer: MEDICARE

## 2021-01-04 ENCOUNTER — ANESTHESIA (OUTPATIENT)
Dept: SURGERY | Facility: HOSPITAL | Age: 76
End: 2021-01-04
Payer: MEDICARE

## 2021-01-04 VITALS
TEMPERATURE: 97 F | WEIGHT: 184.94 LBS | HEART RATE: 79 BPM | SYSTOLIC BLOOD PRESSURE: 169 MMHG | OXYGEN SATURATION: 98 % | BODY MASS INDEX: 30.44 KG/M2 | HEIGHT: 65.5 IN | DIASTOLIC BLOOD PRESSURE: 73 MMHG | RESPIRATION RATE: 14 BRPM

## 2021-01-04 DIAGNOSIS — C50.012 MALIGNANT NEOPLASM OF AREOLA OF LEFT BREAST IN FEMALE (HCC): Primary | ICD-10-CM

## 2021-01-04 DIAGNOSIS — C50.912 MALIGNANT NEOPLASM OF LEFT FEMALE BREAST, UNSPECIFIED ESTROGEN RECEPTOR STATUS, UNSPECIFIED SITE OF BREAST (HCC): ICD-10-CM

## 2021-01-04 PROCEDURE — 0HBU0ZZ EXCISION OF LEFT BREAST, OPEN APPROACH: ICD-10-PCS | Performed by: SURGERY

## 2021-01-04 PROCEDURE — A4648 IMPLANTABLE TISSUE MARKER: HCPCS

## 2021-01-04 PROCEDURE — 88307 TISSUE EXAM BY PATHOLOGIST: CPT | Performed by: SURGERY

## 2021-01-04 PROCEDURE — 19281 PERQ DEVICE BREAST 1ST IMAG: CPT | Performed by: SURGERY

## 2021-01-04 PROCEDURE — 76098 X-RAY EXAM SURGICAL SPECIMEN: CPT | Performed by: SURGERY

## 2021-01-04 RX ORDER — SODIUM CHLORIDE, SODIUM LACTATE, POTASSIUM CHLORIDE, CALCIUM CHLORIDE 600; 310; 30; 20 MG/100ML; MG/100ML; MG/100ML; MG/100ML
INJECTION, SOLUTION INTRAVENOUS CONTINUOUS
Status: DISCONTINUED | OUTPATIENT
Start: 2021-01-04 | End: 2021-01-04

## 2021-01-04 RX ORDER — DEXAMETHASONE SODIUM PHOSPHATE 4 MG/ML
VIAL (ML) INJECTION AS NEEDED
Status: DISCONTINUED | OUTPATIENT
Start: 2021-01-04 | End: 2021-01-04 | Stop reason: SURG

## 2021-01-04 RX ORDER — DIAZEPAM 5 MG/1
5 TABLET ORAL AS NEEDED
Status: DISCONTINUED | OUTPATIENT
Start: 2021-01-04 | End: 2021-01-04 | Stop reason: HOSPADM

## 2021-01-04 RX ORDER — ACETAMINOPHEN 500 MG
1000 TABLET ORAL ONCE
COMMUNITY
End: 2021-01-11

## 2021-01-04 RX ORDER — ACETAMINOPHEN 500 MG
1000 TABLET ORAL ONCE
Status: DISCONTINUED | OUTPATIENT
Start: 2021-01-04 | End: 2021-01-04

## 2021-01-04 RX ORDER — BUPIVACAINE HYDROCHLORIDE 5 MG/ML
INJECTION, SOLUTION EPIDURAL; INTRACAUDAL AS NEEDED
Status: DISCONTINUED | OUTPATIENT
Start: 2021-01-04 | End: 2021-01-04 | Stop reason: HOSPADM

## 2021-01-04 RX ORDER — LIDOCAINE HYDROCHLORIDE 10 MG/ML
INJECTION, SOLUTION EPIDURAL; INFILTRATION; INTRACAUDAL; PERINEURAL AS NEEDED
Status: DISCONTINUED | OUTPATIENT
Start: 2021-01-04 | End: 2021-01-04 | Stop reason: SURG

## 2021-01-04 RX ORDER — NALOXONE HYDROCHLORIDE 0.4 MG/ML
80 INJECTION, SOLUTION INTRAMUSCULAR; INTRAVENOUS; SUBCUTANEOUS AS NEEDED
Status: DISCONTINUED | OUTPATIENT
Start: 2021-01-04 | End: 2021-01-04

## 2021-01-04 RX ORDER — ONDANSETRON 2 MG/ML
INJECTION INTRAMUSCULAR; INTRAVENOUS AS NEEDED
Status: DISCONTINUED | OUTPATIENT
Start: 2021-01-04 | End: 2021-01-04 | Stop reason: SURG

## 2021-01-04 RX ORDER — LIDOCAINE HYDROCHLORIDE AND EPINEPHRINE 10; 10 MG/ML; UG/ML
INJECTION, SOLUTION INFILTRATION; PERINEURAL AS NEEDED
Status: DISCONTINUED | OUTPATIENT
Start: 2021-01-04 | End: 2021-01-04 | Stop reason: HOSPADM

## 2021-01-04 RX ORDER — CEFAZOLIN SODIUM/WATER 2 G/20 ML
2 SYRINGE (ML) INTRAVENOUS ONCE
Status: COMPLETED | OUTPATIENT
Start: 2021-01-04 | End: 2021-01-04

## 2021-01-04 RX ORDER — HYDROCODONE BITARTRATE AND ACETAMINOPHEN 5; 325 MG/1; MG/1
1 TABLET ORAL AS NEEDED
Status: DISCONTINUED | OUTPATIENT
Start: 2021-01-04 | End: 2021-01-04

## 2021-01-04 RX ORDER — TRAMADOL HYDROCHLORIDE 50 MG/1
50 TABLET ORAL EVERY 6 HOURS PRN
Qty: 20 TABLET | Refills: 1 | Status: SHIPPED | OUTPATIENT
Start: 2021-01-04 | End: 2021-01-11

## 2021-01-04 RX ORDER — HYDROCODONE BITARTRATE AND ACETAMINOPHEN 5; 325 MG/1; MG/1
2 TABLET ORAL AS NEEDED
Status: DISCONTINUED | OUTPATIENT
Start: 2021-01-04 | End: 2021-01-04

## 2021-01-04 RX ORDER — ONDANSETRON 2 MG/ML
4 INJECTION INTRAMUSCULAR; INTRAVENOUS AS NEEDED
Status: DISCONTINUED | OUTPATIENT
Start: 2021-01-04 | End: 2021-01-04

## 2021-01-04 RX ADMIN — DEXAMETHASONE SODIUM PHOSPHATE 4 MG: 4 MG/ML VIAL (ML) INJECTION at 10:43:00

## 2021-01-04 RX ADMIN — ONDANSETRON 4 MG: 2 INJECTION INTRAMUSCULAR; INTRAVENOUS at 10:59:00

## 2021-01-04 RX ADMIN — SODIUM CHLORIDE, SODIUM LACTATE, POTASSIUM CHLORIDE, CALCIUM CHLORIDE: 600; 310; 30; 20 INJECTION, SOLUTION INTRAVENOUS at 11:11:00

## 2021-01-04 RX ADMIN — LIDOCAINE HYDROCHLORIDE 100 MG: 10 INJECTION, SOLUTION EPIDURAL; INFILTRATION; INTRACAUDAL; PERINEURAL at 10:35:00

## 2021-01-04 RX ADMIN — CEFAZOLIN SODIUM/WATER 2 G: 2 G/20 ML SYRINGE (ML) INTRAVENOUS at 10:39:00

## 2021-01-04 NOTE — H&P
History & Physical Examination    Patient Name: Stefan Sharp  MRN: [id number]  CSN: [id number]  YOB: 1945    Diagnosis: left breast cancer        •  acetaminophen 500 MG Oral Tab, Take 1,000 mg by mouth one time. , Disp: , Rfl: , 1/ 4/2016    left breast   • Calculus of kidney    • Colon polyps    • Deep vein thrombosis ([facility] [address]     1997   • Diverticulosis of large intestine    • Dry skin    • Exposure to radiation     2006   • Food intolerance     Lactose intolerant   • Frequent use of at [facility] N/A 6/2/2016    Performed by Lavon Reaves MD at [facility] MAIN OR   • LUMPECTOMY LEFT  4-2016    INVASIVE DUCTAL   • LUMPECTOMY LEFT  01/2021    invasive ductal   • LUMPECTOMY RIGHT  2-2006, 4-2006 02-06 re exci UROGENITAL [ ] [ ]    EXTREMITIES x x    OTHER      Plan wire localization and lumpectomy  Further recommendations after final pathology  [ x ] I have discussed the risks and benefits and alternatives with the patient/family.   They understand and agree t

## 2021-01-04 NOTE — BRIEF OP NOTE
Pre-Operative Diagnosis: Malignant neoplasm of left female breast, unspecified estrogen receptor status, unspecified site of [facility] [C50.912]     Post-Operative Diagnosis: Malignant neoplasm of left female breast, unspecified estrogen receptor status

## 2021-01-04 NOTE — ANESTHESIA POSTPROCEDURE EVALUATION
[address] Patient Status:  Hospital Outpatient Surgery   Age/Gender 77year old female MRN [id number]   [facility] SURGERY Attending Peggy Rodriguez, [facility] Se Day # 0 PCP iSlas Castellanos MD       Anesthesia Post-

## 2021-01-04 NOTE — IMAGING NOTE
0915: Assisted  with needle localization of the Left breast.   Order verified. Procedure explained and questions answered. Ms. Laurel Duane verbalized understanding and agreement. Written consent obtained. Time out completed.   Site prepped in a

## 2021-01-04 NOTE — ANESTHESIA PREPROCEDURE EVALUATION
PRE-OP EVALUATION    Patient Name: Dominique Carrasco    Pre-op Diagnosis: Malignant neoplasm of left female breast, unspecified estrogen receptor status, unspecified site of breast [facility] [C50.912]    Procedure(s):  WIRE LOCALIZATION LEFT BREAST LUMP Complications  (+) history of anesthetic complications  History of: PONV       GI/Hepatic/Renal      (+) GERD and well controlled      (+) chronic renal disease and CRI                   Cardiovascular  Comment: H/o DVT              (+) obesity     (+) hyp NODULE 2 SITE BILAT (CPT=19081/85664)  2000    BENIGN   • GERONIMO BIOPSY STEREO NODULE 2 SITE BILAT (CPT=19081/63629)  2016    INVASIVE DUCTAL CA   • OTHER  6/2/16    colostomy reversal   • OTHER SURGICAL HISTORY      LEFT FOOT NEUROMA RESECTION   • OTHER SURG proceed.   Plan/risks discussed with: patient and spouse                Present on Admission:  **None**

## 2021-01-04 NOTE — ANESTHESIA PROCEDURE NOTES
Airway  Date/Time: 1/4/2021 10:37 AM  Urgency: elective      General Information and Staff    Patient location during procedure: OR  Anesthesiologist: Teja Borden MD  Performed: anesthesiologist     Indications and Patient Condition  Indications

## 2021-01-05 NOTE — OPERATIVE REPORT
[facility]    PATIENT'S NAME: Joe Doll   ATTENDING PHYSICIAN: Larissa Turner M.D. OPERATING PHYSICIAN: Larissa Turner M.D.    PATIENT ACCOUNT#:   [de-identified]    LOCATION:  PACU [facility] PACU 5 EDWP 10  MEDICAL RECORD #:   [id number]

## 2021-01-06 ENCOUNTER — NURSE NAVIGATOR ENCOUNTER (OUTPATIENT)
Dept: HEMATOLOGY/ONCOLOGY | Facility: HOSPITAL | Age: 76
End: 2021-01-06

## 2021-01-06 NOTE — PROGRESS NOTES
Phoned patient to discuss plan of care. Pt had surgery with Dr. Cheyanne Carson and is doing well, minimal pain, for which she is only taking ibuprofen.  Assisted patient in scheduling follow up with Dr. Jonathan Jovel on 1/11, we discussed that he will refer her to survivor

## 2021-01-11 ENCOUNTER — OFFICE VISIT (OUTPATIENT)
Dept: HEMATOLOGY/ONCOLOGY | Facility: HOSPITAL | Age: 76
End: 2021-01-11
Attending: INTERNAL MEDICINE
Payer: MEDICARE

## 2021-01-11 ENCOUNTER — LAB ENCOUNTER (OUTPATIENT)
Dept: LAB | Age: 76
End: 2021-01-11
Attending: INTERNAL MEDICINE
Payer: MEDICARE

## 2021-01-11 VITALS
RESPIRATION RATE: 16 BRPM | SYSTOLIC BLOOD PRESSURE: 166 MMHG | WEIGHT: 186 LBS | HEIGHT: 65.51 IN | HEART RATE: 80 BPM | TEMPERATURE: 98 F | DIASTOLIC BLOOD PRESSURE: 82 MMHG | BODY MASS INDEX: 30.62 KG/M2 | OXYGEN SATURATION: 94 %

## 2021-01-11 DIAGNOSIS — N18.30 STAGE 3 CHRONIC KIDNEY DISEASE (HCC): ICD-10-CM

## 2021-01-11 DIAGNOSIS — C50.312 MALIGNANT NEOPLASM OF LOWER-INNER QUADRANT OF LEFT BREAST IN FEMALE, ESTROGEN RECEPTOR POSITIVE (HCC): Primary | ICD-10-CM

## 2021-01-11 DIAGNOSIS — Z17.0 MALIGNANT NEOPLASM OF LOWER-INNER QUADRANT OF LEFT BREAST IN FEMALE, ESTROGEN RECEPTOR POSITIVE (HCC): Primary | ICD-10-CM

## 2021-01-11 LAB
ANION GAP SERPL CALC-SCNC: 5 MMOL/L (ref 0–18)
BASOPHILS # BLD AUTO: 0.03 X10(3) UL (ref 0–0.2)
BASOPHILS NFR BLD AUTO: 0.4 %
BILIRUB UR QL STRIP.AUTO: NEGATIVE
BUN BLD-MCNC: 26 MG/DL (ref 7–18)
BUN/CREAT SERPL: 21.8 (ref 10–20)
CALCIUM BLD-MCNC: 9 MG/DL (ref 8.5–10.1)
CHLORIDE SERPL-SCNC: 107 MMOL/L (ref 98–112)
CLARITY UR REFRACT.AUTO: CLEAR
CO2 SERPL-SCNC: 28 MMOL/L (ref 21–32)
COLOR UR AUTO: YELLOW
CREAT BLD-MCNC: 1.19 MG/DL
CREAT UR-SCNC: 116 MG/DL
DEPRECATED RDW RBC AUTO: 44.6 FL (ref 35.1–46.3)
EOSINOPHIL # BLD AUTO: 0.18 X10(3) UL (ref 0–0.7)
EOSINOPHIL NFR BLD AUTO: 2.3 %
ERYTHROCYTE [DISTWIDTH] IN BLOOD BY AUTOMATED COUNT: 13.6 % (ref 11–15)
GLUCOSE BLD-MCNC: 151 MG/DL (ref 70–99)
GLUCOSE UR STRIP.AUTO-MCNC: NEGATIVE MG/DL
HAV IGM SER QL: 2.2 MG/DL (ref 1.6–2.6)
HCT VFR BLD AUTO: 42.8 %
HGB BLD-MCNC: 13.8 G/DL
IMM GRANULOCYTES # BLD AUTO: 0.03 X10(3) UL (ref 0–1)
IMM GRANULOCYTES NFR BLD: 0.4 %
KETONES UR STRIP.AUTO-MCNC: NEGATIVE MG/DL
LYMPHOCYTES # BLD AUTO: 2.22 X10(3) UL (ref 1–4)
LYMPHOCYTES NFR BLD AUTO: 28.4 %
MCH RBC QN AUTO: 29 PG (ref 26–34)
MCHC RBC AUTO-ENTMCNC: 32.2 G/DL (ref 31–37)
MCV RBC AUTO: 89.9 FL
MONOCYTES # BLD AUTO: 0.93 X10(3) UL (ref 0.1–1)
MONOCYTES NFR BLD AUTO: 11.9 %
NEUTROPHILS # BLD AUTO: 4.44 X10 (3) UL (ref 1.5–7.7)
NEUTROPHILS # BLD AUTO: 4.44 X10(3) UL (ref 1.5–7.7)
NEUTROPHILS NFR BLD AUTO: 56.6 %
NITRITE UR QL STRIP.AUTO: NEGATIVE
OSMOLALITY SERPL CALC.SUM OF ELEC: 298 MOSM/KG (ref 275–295)
PATIENT FASTING Y/N/NP: NO
PH UR STRIP.AUTO: 6 [PH] (ref 4.5–8)
PHOSPHATE SERPL-MCNC: 2.8 MG/DL (ref 2.5–4.9)
PLATELET # BLD AUTO: 210 10(3)UL (ref 150–450)
POTASSIUM SERPL-SCNC: 4.2 MMOL/L (ref 3.5–5.1)
PROT UR STRIP.AUTO-MCNC: NEGATIVE MG/DL
PROT UR-MCNC: 26 MG/DL
PTH-INTACT SERPL-MCNC: 137.5 PG/ML (ref 18.5–88)
RBC # BLD AUTO: 4.76 X10(6)UL
RBC UR QL AUTO: NEGATIVE
SODIUM SERPL-SCNC: 140 MMOL/L (ref 136–145)
SP GR UR STRIP.AUTO: 1.02 (ref 1–1.03)
URATE SERPL-MCNC: 6.8 MG/DL
UROBILINOGEN UR STRIP.AUTO-MCNC: <2 MG/DL
VIT D+METAB SERPL-MCNC: 45.9 NG/ML (ref 30–100)
WBC # BLD AUTO: 7.8 X10(3) UL (ref 4–11)

## 2021-01-11 PROCEDURE — 82306 VITAMIN D 25 HYDROXY: CPT

## 2021-01-11 PROCEDURE — 80048 BASIC METABOLIC PNL TOTAL CA: CPT

## 2021-01-11 PROCEDURE — 99214 OFFICE O/P EST MOD 30 MIN: CPT | Performed by: INTERNAL MEDICINE

## 2021-01-11 PROCEDURE — 85025 COMPLETE CBC W/AUTO DIFF WBC: CPT

## 2021-01-11 PROCEDURE — 36415 COLL VENOUS BLD VENIPUNCTURE: CPT

## 2021-01-11 PROCEDURE — 83970 ASSAY OF PARATHORMONE: CPT

## 2021-01-11 PROCEDURE — 81001 URINALYSIS AUTO W/SCOPE: CPT

## 2021-01-11 PROCEDURE — 83735 ASSAY OF MAGNESIUM: CPT

## 2021-01-11 PROCEDURE — 84100 ASSAY OF PHOSPHORUS: CPT

## 2021-01-11 PROCEDURE — 82570 ASSAY OF URINE CREATININE: CPT

## 2021-01-11 PROCEDURE — 84156 ASSAY OF PROTEIN URINE: CPT

## 2021-01-11 PROCEDURE — 84550 ASSAY OF BLOOD/URIC ACID: CPT

## 2021-01-11 RX ORDER — ANASTROZOLE 1 MG/1
1 TABLET ORAL DAILY
Qty: 90 TABLET | Refills: 3 | Status: SHIPPED | OUTPATIENT
Start: 2021-01-11 | End: 2022-01-03

## 2021-01-11 NOTE — PROGRESS NOTES
Cancer Center Progress Note    Problem List:      Patient Active Problem List:     Breast cancer, right breast ([facility] [address]     Colon polyps     Asthma     Vitamin D deficiency     Osteoarthrosis, unspecified whether generalized or localized, lower leg     Kidney 1, solid and cribriform types. -The margins are negative for DCIS (more than 2 mm). -Focal atypical ductal hyperplasia. -Prior biopsy site changes. Review of Systems:   The pertinent positives and negatives were described.  All other systems w RIGHT     • CYSTOSCOPY STENT INSERTION Left 1/3/2016    Performed by Nick Rocha MD at [address] [place] [address], [address] Left 12/12/2015    Performed by Sherif Holland MD at [facility] MAIN OR   • CYSTOSCOPY,URETEROSCOPY,LITHOTRIPSY Left 12/12/15    cys 61   • Breast Cancer Paternal Aunt 48        estimate   • Other (Other) Sister         lupus   • Breast Cancer Paternal Aunt         76   • Stroke Maternal Aunt         79       Allergies:       Opioid Analgesics       NAUSEA AND VOMITING    Comment:Pt rep RBC 4.76 01/11/2021    HGB 13.8 01/11/2021    HCT 42.8 01/11/2021    MCV 89.9 01/11/2021    MCH 29.0 01/11/2021    MCHC 32.2 01/11/2021    RDW 13.6 01/11/2021    .0 01/11/2021     Lab Results   Component Value Date     01/02/2021    K 4.4 01/0 overlapping quadrants (3 o'clock):  ER 0%  MO 0%  Her2 not amplified  Ki-67 22%  Lumpectomy on 5/9/2016  0.9 cm cancer  0/2 SLNs  Left breast radiation completed on 8/18/2016     She has ROE of the ER negative small cancer.     Right breast cancer:     She

## 2021-01-18 RX ORDER — FUROSEMIDE 20 MG/1
20 TABLET ORAL 2 TIMES DAILY
Qty: 60 TABLET | Refills: 0 | Status: SHIPPED | OUTPATIENT
Start: 2021-01-18 | End: 2021-02-03

## 2021-01-29 ENCOUNTER — OFFICE VISIT (OUTPATIENT)
Dept: NEPHROLOGY | Facility: CLINIC | Age: 76
End: 2021-01-29
Payer: MEDICARE

## 2021-01-29 VITALS — BODY MASS INDEX: 30 KG/M2 | DIASTOLIC BLOOD PRESSURE: 74 MMHG | SYSTOLIC BLOOD PRESSURE: 122 MMHG | WEIGHT: 184.81 LBS

## 2021-01-29 DIAGNOSIS — N18.30 STAGE 3 CHRONIC KIDNEY DISEASE, UNSPECIFIED WHETHER STAGE 3A OR 3B CKD (HCC): Primary | ICD-10-CM

## 2021-01-29 PROCEDURE — 99213 OFFICE O/P EST LOW 20 MIN: CPT | Performed by: INTERNAL MEDICINE

## 2021-01-29 NOTE — PROGRESS NOTES
Nephrology Progress Note      Stefan Sharp is a 77year old female. HPI:   No chief complaint on file. 10year old female with hx of breast ca, nephrolithasis, CKD - stage III here for follow up.   Patient's kidney stone was managed by Marcheta Gold visit.   Wt Readings from Last 6 Encounters:  01/11/21 : 186 lb (84.4 kg)  01/04/21 : 184 lb 15.5 oz (83.9 kg)  12/01/20 : 184 lb 6.4 oz (83.6 kg)  11/30/20 : 175 lb (79.4 kg)  11/24/20 : 175 lb (79.4 kg)  11/16/20 : 182 lb (82.6 kg)       General: Alert an 16.0 g/dL 13.8   Hematocrit Latest Ref Range: 35.0 - 48.0 % 42.8   Platelet Count Latest Ref Range: 150.0 - 450.0 10(3)uL 210.0   RBC Latest Ref Range: 3.80 - 5.30 x10(6)uL 4.76   MCH Latest Ref Range: 26.0 - 34.0 pg 29.0   MCHC Latest Ref Range: 31.0 - 37 Moderate (A)   RENAL TUBULAR EPITHELIAL CELLS Latest Ref Range: Small /LPF None Seen   TRANSITIONAL EPI CELLS Latest Ref Range: Small /LPF None Seen   YEAST URINE Latest Ref Range: None Seen  None Seen   TOTAL PROTEIN URINE RANDOM Latest Units: mg/dL 26.0

## 2021-02-03 DIAGNOSIS — R07.9 CHEST PAIN, UNSPECIFIED TYPE: ICD-10-CM

## 2021-02-03 DIAGNOSIS — Z23 NEED FOR VACCINATION: ICD-10-CM

## 2021-02-03 NOTE — TELEPHONE ENCOUNTER
Patient needs two prescriptions refilled:    Furosemide  Propranolol    To be sent to:    [facility]/PHARMACY #[id number]- [place], [place] - [address] [facility] [address], [phone], [phone]

## 2021-02-04 RX ORDER — FUROSEMIDE 20 MG/1
20 TABLET ORAL 2 TIMES DAILY
Qty: 180 TABLET | Refills: 1 | Status: SHIPPED | OUTPATIENT
Start: 2021-02-04 | End: 2021-08-13

## 2021-02-04 RX ORDER — PROPRANOLOL HYDROCHLORIDE 10 MG/1
10 TABLET ORAL 3 TIMES DAILY PRN
Qty: 270 TABLET | Refills: 1 | Status: SHIPPED | OUTPATIENT
Start: 2021-02-04

## 2021-02-06 ENCOUNTER — IMMUNIZATION (OUTPATIENT)
Dept: LAB | Age: 76
End: 2021-02-06
Attending: HOSPITALIST
Payer: MEDICARE

## 2021-02-06 DIAGNOSIS — Z23 NEED FOR VACCINATION: Primary | ICD-10-CM

## 2021-02-06 PROCEDURE — 0001A SARSCOV2 VAC 30MCG/0.3ML IM: CPT

## 2021-02-24 ENCOUNTER — TELEPHONE (OUTPATIENT)
Dept: HEMATOLOGY/ONCOLOGY | Facility: HOSPITAL | Age: 76
End: 2021-02-24

## 2021-02-27 ENCOUNTER — IMMUNIZATION (OUTPATIENT)
Dept: LAB | Age: 76
End: 2021-02-27
Attending: HOSPITALIST
Payer: MEDICARE

## 2021-02-27 DIAGNOSIS — Z23 NEED FOR VACCINATION: Primary | ICD-10-CM

## 2021-02-27 PROCEDURE — 0002A SARSCOV2 VAC 30MCG/0.3ML IM: CPT

## 2021-04-14 ENCOUNTER — OFFICE VISIT (OUTPATIENT)
Dept: HEMATOLOGY/ONCOLOGY | Facility: HOSPITAL | Age: 76
End: 2021-04-14
Attending: INTERNAL MEDICINE
Payer: MEDICARE

## 2021-04-14 DIAGNOSIS — Z78.0 ASYMPTOMATIC MENOPAUSE: ICD-10-CM

## 2021-04-14 DIAGNOSIS — C50.312 MALIGNANT NEOPLASM OF LOWER-INNER QUADRANT OF LEFT BREAST IN FEMALE, ESTROGEN RECEPTOR POSITIVE (HCC): Primary | ICD-10-CM

## 2021-04-14 DIAGNOSIS — Z17.0 MALIGNANT NEOPLASM OF LOWER-INNER QUADRANT OF LEFT BREAST IN FEMALE, ESTROGEN RECEPTOR POSITIVE (HCC): Primary | ICD-10-CM

## 2021-04-14 PROCEDURE — 99214 OFFICE O/P EST MOD 30 MIN: CPT | Performed by: INTERNAL MEDICINE

## 2021-04-14 NOTE — PROGRESS NOTES
Cancer Center Progress Note    Problem List:      Patient Active Problem List:     Breast cancer, right breast ([facility] [address]     Colon polyps     Asthma     Vitamin D deficiency     Osteoarthrosis, unspecified whether generalized or localized, lower leg     Kidney 3, Nuclear: 1, Mitoses: 1). -The margins are negative for invasive carcinoma (more than 5 mm). -Ductal carcinoma in situ, nuclear grade 1, solid and cribriform types. -The margins are negative for DCIS (more than 2 mm).   -Focal atypical ductal hy Umair Peralta (rpt 3 yrs)   • COLONOSCOPY N/A 4/26/2016    Performed by Robert Hook MD at [facility] ENDOSCOPY   • CYST ASPIRATION LEFT     • CYST ASPIRATION RIGHT     • CYSTOSCOPY STENT INSERTION Left 1/3/2016    Performed by Mya Colin MD at [facility] Diabetes Maternal Grandmother    • Cancer Paternal Grandfather 80        COLON   • Colon Cancer Paternal Grandfather         80   • Diabetes Paternal Grandmother    • Heart Disorder Paternal Grandmother    • Breast Cancer Self 61   • Breast Cancer Paternal --  SpO2: --      Performance Status:  ECOG 0: Fully active, able to carry on all pre-disease performance without restriction    Physical Examination:    Constitutional: Patient is alert and not in acute distress. Eyes: Anicteric sclera, pink conjunctiva. Stable postsurgical changes.     =====  CONCLUSION:     BIRADS Code 2: BENIGN FINDING. RECOMMENDATIONS:    FOLLOW-UP: Routine screening follow-up recommended.        Assessment/Plan:     New left breast cancer in the lower inner breast:  Grade I  ER

## 2021-05-14 ENCOUNTER — TELEPHONE (OUTPATIENT)
Dept: HEMATOLOGY/ONCOLOGY | Facility: HOSPITAL | Age: 76
End: 2021-05-14

## 2021-06-05 ENCOUNTER — LAB ENCOUNTER (OUTPATIENT)
Dept: LAB | Age: 76
End: 2021-06-05
Attending: INTERNAL MEDICINE
Payer: MEDICARE

## 2021-06-05 DIAGNOSIS — K63.5 COLON POLYPS: ICD-10-CM

## 2021-06-05 PROCEDURE — 36415 COLL VENOUS BLD VENIPUNCTURE: CPT

## 2021-06-05 PROCEDURE — 80051 ELECTROLYTE PANEL: CPT

## 2021-06-07 ENCOUNTER — ANESTHESIA EVENT (OUTPATIENT)
Dept: ENDOSCOPY | Facility: HOSPITAL | Age: 76
End: 2021-06-07
Payer: MEDICARE

## 2021-06-07 NOTE — ANESTHESIA PREPROCEDURE EVALUATION
PRE-OP EVALUATION    Patient Name: Gianni Redding    Admit Diagnosis: Personal history of colonic polyps [Z86.010]    Pre-op Diagnosis: Personal history of colonic polyps [Z86.010]    COLONOSCOPY    Anesthesia Procedure: COLONOSCOPY (N/A )    Jacky invasive ductal   • LUMPECTOMY RIGHT  2-2006, 4-2006 02-06 re excision 04-06 rad    • GERONIMO BIOPSY STEREO NODULE 2 SITE BILAT (CPT=19081/45790)  2000    BENIGN   • GERONIMO BIOPSY STEREO NODULE 2 SITE BILAT (CPT=19081/62663)  2016    INVASIVE DUCTAL CA   • OTH

## 2021-06-08 ENCOUNTER — ANESTHESIA (OUTPATIENT)
Dept: ENDOSCOPY | Facility: HOSPITAL | Age: 76
End: 2021-06-08
Payer: MEDICARE

## 2021-06-08 ENCOUNTER — HOSPITAL ENCOUNTER (OUTPATIENT)
Facility: HOSPITAL | Age: 76
Setting detail: HOSPITAL OUTPATIENT SURGERY
Discharge: HOME | End: 2021-06-08
Attending: INTERNAL MEDICINE | Admitting: INTERNAL MEDICINE
Payer: MEDICARE

## 2021-06-08 VITALS
SYSTOLIC BLOOD PRESSURE: 132 MMHG | TEMPERATURE: 99 F | BODY MASS INDEX: 28.81 KG/M2 | HEIGHT: 65.5 IN | HEART RATE: 73 BPM | RESPIRATION RATE: 16 BRPM | OXYGEN SATURATION: 98 % | DIASTOLIC BLOOD PRESSURE: 62 MMHG | WEIGHT: 175 LBS

## 2021-06-08 DIAGNOSIS — Z86.010 PERSONAL HISTORY OF COLONIC POLYPS: ICD-10-CM

## 2021-06-08 DIAGNOSIS — K63.5 COLON POLYPS: Primary | ICD-10-CM

## 2021-06-08 PROCEDURE — 0DBK8ZX EXCISION OF ASCENDING COLON, VIA NATURAL OR ARTIFICIAL OPENING ENDOSCOPIC, DIAGNOSTIC: ICD-10-PCS | Performed by: INTERNAL MEDICINE

## 2021-06-08 PROCEDURE — 0DBN8ZX EXCISION OF SIGMOID COLON, VIA NATURAL OR ARTIFICIAL OPENING ENDOSCOPIC, DIAGNOSTIC: ICD-10-PCS | Performed by: INTERNAL MEDICINE

## 2021-06-08 PROCEDURE — 88305 TISSUE EXAM BY PATHOLOGIST: CPT | Performed by: INTERNAL MEDICINE

## 2021-06-08 RX ORDER — SODIUM CHLORIDE, SODIUM LACTATE, POTASSIUM CHLORIDE, CALCIUM CHLORIDE 600; 310; 30; 20 MG/100ML; MG/100ML; MG/100ML; MG/100ML
INJECTION, SOLUTION INTRAVENOUS CONTINUOUS
Status: DISCONTINUED | OUTPATIENT
Start: 2021-06-08 | End: 2021-06-08

## 2021-06-08 RX ORDER — NALOXONE HYDROCHLORIDE 0.4 MG/ML
80 INJECTION, SOLUTION INTRAMUSCULAR; INTRAVENOUS; SUBCUTANEOUS AS NEEDED
Status: DISCONTINUED | OUTPATIENT
Start: 2021-06-08 | End: 2021-06-08

## 2021-06-08 RX ORDER — LIDOCAINE HYDROCHLORIDE 10 MG/ML
INJECTION, SOLUTION EPIDURAL; INFILTRATION; INTRACAUDAL; PERINEURAL AS NEEDED
Status: DISCONTINUED | OUTPATIENT
Start: 2021-06-08 | End: 2021-06-08 | Stop reason: SURG

## 2021-06-08 RX ADMIN — SODIUM CHLORIDE, SODIUM LACTATE, POTASSIUM CHLORIDE, CALCIUM CHLORIDE: 600; 310; 30; 20 INJECTION, SOLUTION INTRAVENOUS at 08:10:00

## 2021-06-08 RX ADMIN — LIDOCAINE HYDROCHLORIDE 25 MG: 10 INJECTION, SOLUTION EPIDURAL; INFILTRATION; INTRACAUDAL; PERINEURAL at 08:15:00

## 2021-06-08 NOTE — H&P
[facility]    1945 MRN [id number]   Location [id number] [place] [address] Attending Madalyn Guzman MD   Date 2021 PCP No primary care prov GAVIN, EDW   • D & C     • EGD  02/28/17    [place] Hiatal hernia   • HERNIA SURGERY  January, 2017    surgical removal July, 2017   • KNEE REPLACEMENT SURGERY      LEFT KNEE   • LUMPECTOMY LEFT  4-2016    INVASIVE DUCTAL   • LUMPECTOMY LEFT  01/2021    invas dose  Prednisone              DIARRHEA, DIZZINESS, NAUSEA ONLY  Sulfa Antibiotics       HIVES  Seasonal                Runny nose    Medications:  No current facility-administered medications for this encounter.     Physical Exam:    Height 5' 5.5\" (1.664

## 2021-06-08 NOTE — ANESTHESIA POSTPROCEDURE EVALUATION
[address] Patient Status:  Hospital Outpatient Surgery   Age/Gender 77year old female MRN [id number]   Location [address] [place] [address] Attending Ana Cazares MD   Hosp Day # 0 PCP No primary care provi

## 2021-06-08 NOTE — OPERATIVE REPORT
Dennise Meeks Patient Status:  Hospital Outpatient Surgery    1945 MRN [id number]   Location [address] [place] [address] Attending Hayes Melendez MD   Date 2021 PCP No primary care provider on file.      PREOPERATIVE DI hemorrhoids  RECOMMENDATIONS:   - Post Colonoscopy/polypectomy precautions, watch for bleeding, infection, perforation, adverse drug reactions   - Follow biopsies.  - Repeat colonoscopy in 5-7 years if clinically indicated at that time.     Tano Aviles

## 2021-06-14 NOTE — PROGRESS NOTES
Date: 2021    To: Genesis Bloom  : 1945     I hope this letter finds you doing well. I am writing to inform you of the following:        The biopsies obtained from your recent colonoscopy indicate that the polyps were adenomas which

## 2021-07-03 NOTE — PHYSICAL THERAPY NOTE
PHYSICAL THERAPY KNEE TREATMENT NOTE - INPATIENT     Room Number: 359/359-A     Session: 1&2   Number of Visits to Meet Established Goals: 3    Presenting Problem: s/p right TKA 6/12/19    Problem List  Active Problems:    * No active hospital problems.  * by Tg Glaser MD at [facility] [place] ENDOSCOPY   • CYST ASPIRATION LEFT     • CYST ASPIRATION RIGHT     • CYSTOSCOPY STENT INSERTION Left 1/3/2016    Performed by Mraia Mcdermott MD at [facility] [place] MAIN OR   • [place] Left 12/12/2015    Performed by Kelsey Geronimo Good  Dynamic Sitting: Good  Static Standing: Poor +  Dynamic Standing: Poor +    ACTIVITY TOLERANCE                         O2 WALK                  AM-PAC '6-Clicks' INPATIENT SHORT FORM - BASIC MOBILITY  How much difficulty does the patient currently ha mobility. Pt was trained on stairs with sup for safety. Pt negotiated 4 number of steps with gary rails via step to gait pattern to improve safety.  Pt was educated on simulated car transfers and stoop with RW   During both sessions, pt was educated on keep Patient is able to ambulate 150 feet with assistive device at assistance level: supervision  Met     Goal #4     Patient will negotiate 4 stairs/one curb w/ assistive device and supervision  Met     Goal #5     AROM 0 degrees extension to 95 degrees flexio complains of pain/discomfort

## 2021-07-15 ENCOUNTER — HOSPITAL ENCOUNTER (OUTPATIENT)
Dept: MAMMOGRAPHY | Age: 76
Discharge: HOME | End: 2021-07-15
Attending: SURGERY
Payer: MEDICARE

## 2021-07-15 ENCOUNTER — HOSPITAL ENCOUNTER (OUTPATIENT)
Dept: ULTRASOUND IMAGING | Age: 76
Discharge: HOME | End: 2021-07-15
Attending: SURGERY
Payer: MEDICARE

## 2021-07-15 DIAGNOSIS — Z85.3 HISTORY OF LEFT BREAST CANCER: ICD-10-CM

## 2021-07-15 DIAGNOSIS — Z85.3 HISTORY OF RIGHT BREAST CANCER: ICD-10-CM

## 2021-07-15 DIAGNOSIS — R92.8 OTHER ABNORMAL AND INCONCLUSIVE FINDINGS ON DIAGNOSTIC IMAGING OF BREAST: ICD-10-CM

## 2021-07-15 DIAGNOSIS — Z98.890 S/P LUMPECTOMY, LEFT BREAST: ICD-10-CM

## 2021-07-15 PROCEDURE — 77066 DX MAMMO INCL CAD BI: CPT | Performed by: SURGERY

## 2021-07-15 PROCEDURE — 76642 ULTRASOUND BREAST LIMITED: CPT | Performed by: SURGERY

## 2021-07-15 PROCEDURE — 77062 BREAST TOMOSYNTHESIS BI: CPT | Performed by: SURGERY

## 2021-08-13 RX ORDER — FUROSEMIDE 20 MG/1
TABLET ORAL
Qty: 180 TABLET | Refills: 1 | Status: SHIPPED | OUTPATIENT
Start: 2021-08-13

## 2021-09-20 ENCOUNTER — HOSPITAL ENCOUNTER (OUTPATIENT)
Dept: BONE DENSITY | Age: 76
Discharge: HOME | End: 2021-09-20
Attending: INTERNAL MEDICINE
Payer: MEDICARE

## 2021-09-20 DIAGNOSIS — Z78.0 ASYMPTOMATIC MENOPAUSE: ICD-10-CM

## 2021-09-20 DIAGNOSIS — Z17.0 MALIGNANT NEOPLASM OF LOWER-INNER QUADRANT OF LEFT BREAST IN FEMALE, ESTROGEN RECEPTOR POSITIVE (HCC): ICD-10-CM

## 2021-09-20 DIAGNOSIS — C50.312 MALIGNANT NEOPLASM OF LOWER-INNER QUADRANT OF LEFT BREAST IN FEMALE, ESTROGEN RECEPTOR POSITIVE (HCC): ICD-10-CM

## 2021-09-20 PROCEDURE — 77080 DXA BONE DENSITY AXIAL: CPT | Performed by: INTERNAL MEDICINE

## 2021-10-15 ENCOUNTER — OFFICE VISIT (OUTPATIENT)
Dept: HEMATOLOGY/ONCOLOGY | Age: 76
End: 2021-10-15
Attending: INTERNAL MEDICINE
Payer: MEDICARE

## 2021-10-15 VITALS
WEIGHT: 178.5 LBS | HEART RATE: 73 BPM | DIASTOLIC BLOOD PRESSURE: 64 MMHG | BODY MASS INDEX: 30 KG/M2 | TEMPERATURE: 97 F | RESPIRATION RATE: 18 BRPM | OXYGEN SATURATION: 99 % | SYSTOLIC BLOOD PRESSURE: 148 MMHG

## 2021-10-15 DIAGNOSIS — C50.312 MALIGNANT NEOPLASM OF LOWER-INNER QUADRANT OF LEFT BREAST IN FEMALE, ESTROGEN RECEPTOR POSITIVE (HCC): Primary | ICD-10-CM

## 2021-10-15 DIAGNOSIS — Z17.0 MALIGNANT NEOPLASM OF LOWER-INNER QUADRANT OF LEFT BREAST IN FEMALE, ESTROGEN RECEPTOR POSITIVE (HCC): Primary | ICD-10-CM

## 2021-10-15 DIAGNOSIS — Z78.0 ASYMPTOMATIC MENOPAUSE: ICD-10-CM

## 2021-10-15 PROCEDURE — 99214 OFFICE O/P EST MOD 30 MIN: CPT | Performed by: INTERNAL MEDICINE

## 2021-10-15 NOTE — PROGRESS NOTES
Cancer Center Progress Note    Problem List:      Patient Active Problem List:     Breast cancer, right breast ([facility] [address]     Colon polyps     Asthma     Vitamin D deficiency     Osteoarthrosis, unspecified whether generalized or localized, lower leg     Kidney Mitoses: 1). -The margins are negative for invasive carcinoma (more than 5 mm). -Ductal carcinoma in situ, nuclear grade 1, solid and cribriform types. -The margins are negative for DCIS (more than 2 mm). -Focal atypical ductal hyperplasia.   -Pr ASPIRATION RIGHT     • CYSTOSCOPY,URETEROSCOPY,LITHOTRIPSY Left 12/12/15    cysto, left URS, laser litho, stent placement, GAVIN, EDW   • D & C     • EGD  02/28/17    [place] Hiatal hernia   • HERNIA SURGERY  January, 2017    surgical removal July, 2017   • K DIZZINESS, NAUSEA ONLY  Sulfa Antibiotics       HIVES  Seasonal                Runny nose    Medications:  FUROSEMIDE 20 MG Oral Tab, TAKE 1 TABLET BY MOUTH TWICE A DAY, Disp: 180 tablet, Rfl: 1  FAMOTIDINE 40 MG Oral Tab, TAKE 1 TABLET BY MOUTH TWICE A DA focal motor or sensory deficit. Skin: No suspicious skin lesion, no rash, no ulceration. Lymphatics: There is no palpable lymphadenopathy throughout in the cervical, supraclavicular, or axillary regions.   Psychiatric: The patient's mood is calm and appro 7/15/2021:  BREAST COMPOSITION:  Scattered areas fibroglandular density.       FINDINGS:   Right breast:  Stable postsurgical changes and post treatment changes in the lateral aspect of the posterior to mid right breast.  Stable skin thickening.  A right-s Gunnar Storey MD

## 2021-10-15 NOTE — PROGRESS NOTES
Patient is here for follow up. On anastrozole. She reports that she is not having any problems with this medications. Initially some leg cramps but this subsided. She denies body aches or hot flashes.    Patient had a mammogram in July and says she has

## 2022-01-03 RX ORDER — ANASTROZOLE 1 MG/1
1 TABLET ORAL DAILY
Qty: 90 TABLET | Refills: 3 | Status: SHIPPED | OUTPATIENT
Start: 2022-01-03 | End: 2022-12-22

## 2022-02-15 RX ORDER — FUROSEMIDE 20 MG/1
TABLET ORAL
Qty: 180 TABLET | Refills: 1 | OUTPATIENT
Start: 2022-02-15

## 2022-03-15 ENCOUNTER — HOSPITAL ENCOUNTER (OUTPATIENT)
Dept: MAMMOGRAPHY | Facility: HOSPITAL | Age: 77
Discharge: HOME | End: 2022-03-15
Attending: SURGERY
Payer: MEDICARE

## 2022-03-15 DIAGNOSIS — C50.912 MALIGNANT NEOPLASM OF LEFT FEMALE BREAST, UNSPECIFIED ESTROGEN RECEPTOR STATUS, UNSPECIFIED SITE OF BREAST (HCC): ICD-10-CM

## 2022-03-15 PROCEDURE — 77066 DX MAMMO INCL CAD BI: CPT | Performed by: SURGERY

## 2022-03-15 PROCEDURE — 77062 BREAST TOMOSYNTHESIS BI: CPT | Performed by: SURGERY

## 2022-04-13 ENCOUNTER — OFFICE VISIT (OUTPATIENT)
Dept: HEMATOLOGY/ONCOLOGY | Facility: HOSPITAL | Age: 77
End: 2022-04-13
Attending: INTERNAL MEDICINE
Payer: MEDICARE

## 2022-04-13 VITALS
TEMPERATURE: 97 F | HEART RATE: 87 BPM | SYSTOLIC BLOOD PRESSURE: 157 MMHG | BODY MASS INDEX: 29 KG/M2 | RESPIRATION RATE: 18 BRPM | WEIGHT: 176 LBS | DIASTOLIC BLOOD PRESSURE: 78 MMHG | OXYGEN SATURATION: 100 %

## 2022-04-13 DIAGNOSIS — Z17.0 MALIGNANT NEOPLASM OF LOWER-INNER QUADRANT OF LEFT BREAST IN FEMALE, ESTROGEN RECEPTOR POSITIVE (HCC): Primary | ICD-10-CM

## 2022-04-13 DIAGNOSIS — C50.312 MALIGNANT NEOPLASM OF LOWER-INNER QUADRANT OF LEFT BREAST IN FEMALE, ESTROGEN RECEPTOR POSITIVE (HCC): Primary | ICD-10-CM

## 2022-04-13 DIAGNOSIS — Z17.0 MALIGNANT NEOPLASM OF RIGHT BREAST IN FEMALE, ESTROGEN RECEPTOR POSITIVE, UNSPECIFIED SITE OF BREAST (HCC): ICD-10-CM

## 2022-04-13 DIAGNOSIS — C50.911 MALIGNANT NEOPLASM OF RIGHT BREAST IN FEMALE, ESTROGEN RECEPTOR POSITIVE, UNSPECIFIED SITE OF BREAST (HCC): ICD-10-CM

## 2022-04-13 PROCEDURE — 99211 OFF/OP EST MAY X REQ PHY/QHP: CPT

## 2022-04-13 NOTE — PROGRESS NOTES
Patient is here for follow up for breast cancer on anastrozole. She denies any hot flashes or joint aches. Her energy and appetite are good. She denies any fever or sweats, dyspnea or cough. Her mammogram was done in March 2022.      Education Record    Learner:  Patient    Disease / Diagnosis: Breast cancer    Barriers / Limitations:  None   Comments:    Method:  Discussion   Comments:    General Topics:  Side effects and symptom management   Comments:    Outcome:  Shows understanding   Comments:

## 2022-04-22 ENCOUNTER — APPOINTMENT (OUTPATIENT)
Dept: HEMATOLOGY/ONCOLOGY | Age: 77
End: 2022-04-22
Attending: INTERNAL MEDICINE
Payer: MEDICARE

## 2022-06-02 ENCOUNTER — LAB ENCOUNTER (OUTPATIENT)
Dept: LAB | Facility: HOSPITAL | Age: 77
End: 2022-06-02
Attending: INTERNAL MEDICINE
Payer: MEDICARE

## 2022-06-02 DIAGNOSIS — N18.30 STAGE 3 CHRONIC KIDNEY DISEASE, UNSPECIFIED WHETHER STAGE 3A OR 3B CKD (HCC): ICD-10-CM

## 2022-06-02 DIAGNOSIS — E55.9 VITAMIN D DEFICIENCY: ICD-10-CM

## 2022-06-02 LAB
ANION GAP SERPL CALC-SCNC: 5 MMOL/L (ref 0–18)
BASOPHILS # BLD AUTO: 0.04 X10(3) UL (ref 0–0.2)
BASOPHILS NFR BLD AUTO: 0.6 %
BILIRUB UR QL STRIP.AUTO: NEGATIVE
BUN BLD-MCNC: 24 MG/DL (ref 7–18)
CALCIUM BLD-MCNC: 9.9 MG/DL (ref 8.5–10.1)
CHLORIDE SERPL-SCNC: 103 MMOL/L (ref 98–112)
CO2 SERPL-SCNC: 31 MMOL/L (ref 21–32)
COLOR UR AUTO: YELLOW
CREAT BLD-MCNC: 1.08 MG/DL
EOSINOPHIL # BLD AUTO: 0.17 X10(3) UL (ref 0–0.7)
EOSINOPHIL NFR BLD AUTO: 2.5 %
ERYTHROCYTE [DISTWIDTH] IN BLOOD BY AUTOMATED COUNT: 13.3 %
FASTING STATUS PATIENT QL REPORTED: NO
GLUCOSE BLD-MCNC: 108 MG/DL (ref 70–99)
GLUCOSE UR STRIP.AUTO-MCNC: NEGATIVE MG/DL
HCT VFR BLD AUTO: 41.1 %
HGB BLD-MCNC: 13.3 G/DL
IMM GRANULOCYTES # BLD AUTO: 0.02 X10(3) UL (ref 0–1)
IMM GRANULOCYTES NFR BLD: 0.3 %
KETONES UR STRIP.AUTO-MCNC: NEGATIVE MG/DL
LEUKOCYTE ESTERASE UR QL STRIP.AUTO: NEGATIVE
LYMPHOCYTES # BLD AUTO: 2.37 X10(3) UL (ref 1–4)
LYMPHOCYTES NFR BLD AUTO: 34.6 %
MAGNESIUM SERPL-MCNC: 2.2 MG/DL (ref 1.6–2.6)
MCH RBC QN AUTO: 28.3 PG (ref 26–34)
MCHC RBC AUTO-ENTMCNC: 32.4 G/DL (ref 31–37)
MCV RBC AUTO: 87.4 FL
MONOCYTES # BLD AUTO: 0.75 X10(3) UL (ref 0.1–1)
MONOCYTES NFR BLD AUTO: 10.9 %
NEUTROPHILS # BLD AUTO: 3.5 X10 (3) UL (ref 1.5–7.7)
NEUTROPHILS # BLD AUTO: 3.5 X10(3) UL (ref 1.5–7.7)
NEUTROPHILS NFR BLD AUTO: 51.1 %
NITRITE UR QL STRIP.AUTO: NEGATIVE
OSMOLALITY SERPL CALC.SUM OF ELEC: 293 MOSM/KG (ref 275–295)
PH UR STRIP.AUTO: 7 [PH] (ref 5–8)
PHOSPHATE SERPL-MCNC: 3.7 MG/DL (ref 2.5–4.9)
PLATELET # BLD AUTO: 174 10(3)UL (ref 150–450)
POTASSIUM SERPL-SCNC: 4.5 MMOL/L (ref 3.5–5.1)
PROT UR STRIP.AUTO-MCNC: NEGATIVE MG/DL
PTH-INTACT SERPL-MCNC: 28.6 PG/ML (ref 18.5–88)
RBC # BLD AUTO: 4.7 X10(6)UL
RBC UR QL AUTO: NEGATIVE
SODIUM SERPL-SCNC: 139 MMOL/L (ref 136–145)
SP GR UR STRIP.AUTO: 1 (ref 1–1.03)
UROBILINOGEN UR STRIP.AUTO-MCNC: <2 MG/DL
VIT D+METAB SERPL-MCNC: 53.9 NG/ML (ref 30–100)
WBC # BLD AUTO: 6.9 X10(3) UL (ref 4–11)

## 2022-06-02 PROCEDURE — 84100 ASSAY OF PHOSPHORUS: CPT

## 2022-06-02 PROCEDURE — 83735 ASSAY OF MAGNESIUM: CPT

## 2022-06-02 PROCEDURE — 83970 ASSAY OF PARATHORMONE: CPT

## 2022-06-02 PROCEDURE — 80048 BASIC METABOLIC PNL TOTAL CA: CPT

## 2022-06-02 PROCEDURE — 82306 VITAMIN D 25 HYDROXY: CPT

## 2022-06-02 PROCEDURE — 36415 COLL VENOUS BLD VENIPUNCTURE: CPT

## 2022-06-02 PROCEDURE — 85025 COMPLETE CBC W/AUTO DIFF WBC: CPT

## 2022-06-02 PROCEDURE — 81003 URINALYSIS AUTO W/O SCOPE: CPT

## 2022-06-07 ENCOUNTER — OFFICE VISIT (OUTPATIENT)
Dept: NEPHROLOGY | Facility: CLINIC | Age: 77
End: 2022-06-07
Payer: MEDICARE

## 2022-06-07 VITALS — DIASTOLIC BLOOD PRESSURE: 78 MMHG | WEIGHT: 176 LBS | SYSTOLIC BLOOD PRESSURE: 136 MMHG | BODY MASS INDEX: 29 KG/M2

## 2022-06-07 DIAGNOSIS — R07.9 CHEST PAIN, UNSPECIFIED TYPE: ICD-10-CM

## 2022-06-07 PROCEDURE — 3078F DIAST BP <80 MM HG: CPT | Performed by: INTERNAL MEDICINE

## 2022-06-07 PROCEDURE — 3075F SYST BP GE 130 - 139MM HG: CPT | Performed by: INTERNAL MEDICINE

## 2022-06-07 PROCEDURE — 99213 OFFICE O/P EST LOW 20 MIN: CPT | Performed by: INTERNAL MEDICINE

## 2022-06-07 RX ORDER — PROPRANOLOL HYDROCHLORIDE 10 MG/1
10 TABLET ORAL 3 TIMES DAILY PRN
Qty: 270 TABLET | Refills: 4 | Status: SHIPPED | OUTPATIENT
Start: 2022-06-07

## 2022-06-07 RX ORDER — FUROSEMIDE 20 MG/1
20 TABLET ORAL DAILY
Qty: 180 TABLET | Refills: 4 | Status: SHIPPED | OUTPATIENT
Start: 2022-06-07

## 2022-08-15 RX ORDER — ANTIARTHRITIC COMBINATION NO.2 900 MG
TABLET ORAL
COMMUNITY

## 2022-08-26 ENCOUNTER — LAB ENCOUNTER (OUTPATIENT)
Dept: LAB | Age: 77
End: 2022-08-26
Attending: SURGERY
Payer: MEDICARE

## 2022-08-26 DIAGNOSIS — Z01.818 PRE-OP TESTING: ICD-10-CM

## 2022-08-26 RX ORDER — INDOCYANINE GREEN AND WATER 25 MG
5 KIT INJECTION ONCE
Status: CANCELLED | OUTPATIENT
Start: 2022-08-26 | End: 2022-08-26

## 2022-08-27 LAB — SARS-COV-2 RNA RESP QL NAA+PROBE: NOT DETECTED

## 2022-08-29 ENCOUNTER — HOSPITAL ENCOUNTER (OUTPATIENT)
Facility: HOSPITAL | Age: 77
Setting detail: HOSPITAL OUTPATIENT SURGERY
Discharge: HOME | End: 2022-08-29
Attending: SURGERY | Admitting: SURGERY
Payer: MEDICARE

## 2022-08-29 ENCOUNTER — ANESTHESIA EVENT (OUTPATIENT)
Dept: SURGERY | Facility: HOSPITAL | Age: 77
End: 2022-08-29
Payer: MEDICARE

## 2022-08-29 ENCOUNTER — ANESTHESIA (OUTPATIENT)
Dept: SURGERY | Facility: HOSPITAL | Age: 77
End: 2022-08-29
Payer: MEDICARE

## 2022-08-29 VITALS
OXYGEN SATURATION: 100 % | RESPIRATION RATE: 14 BRPM | BODY MASS INDEX: 29.32 KG/M2 | HEIGHT: 65 IN | SYSTOLIC BLOOD PRESSURE: 152 MMHG | HEART RATE: 87 BPM | WEIGHT: 176 LBS | DIASTOLIC BLOOD PRESSURE: 71 MMHG | TEMPERATURE: 99 F

## 2022-08-29 DIAGNOSIS — Z01.818 PRE-OP TESTING: Primary | ICD-10-CM

## 2022-08-29 PROCEDURE — 0FT44ZZ RESECTION OF GALLBLADDER, PERCUTANEOUS ENDOSCOPIC APPROACH: ICD-10-PCS | Performed by: SURGERY

## 2022-08-29 RX ORDER — HYDROCODONE BITARTRATE AND ACETAMINOPHEN 5; 325 MG/1; MG/1
1-2 TABLET ORAL EVERY 6 HOURS PRN
Qty: 30 TABLET | Refills: 0 | Status: SHIPPED | OUTPATIENT
Start: 2022-08-29

## 2022-08-29 RX ORDER — LABETALOL HYDROCHLORIDE 5 MG/ML
5 INJECTION, SOLUTION INTRAVENOUS EVERY 5 MIN PRN
Status: DISCONTINUED | OUTPATIENT
Start: 2022-08-29 | End: 2022-08-29

## 2022-08-29 RX ORDER — ALBUTEROL SULFATE 2.5 MG/3ML
2.5 SOLUTION RESPIRATORY (INHALATION) AS NEEDED
Status: DISCONTINUED | OUTPATIENT
Start: 2022-08-29 | End: 2022-08-29

## 2022-08-29 RX ORDER — ONDANSETRON 2 MG/ML
4 INJECTION INTRAMUSCULAR; INTRAVENOUS EVERY 6 HOURS PRN
Status: DISCONTINUED | OUTPATIENT
Start: 2022-08-29 | End: 2022-08-29

## 2022-08-29 RX ORDER — HYDROMORPHONE HYDROCHLORIDE 1 MG/ML
INJECTION, SOLUTION INTRAMUSCULAR; INTRAVENOUS; SUBCUTANEOUS
Status: COMPLETED
Start: 2022-08-29 | End: 2022-08-29

## 2022-08-29 RX ORDER — HYDROMORPHONE HYDROCHLORIDE 1 MG/ML
0.4 INJECTION, SOLUTION INTRAMUSCULAR; INTRAVENOUS; SUBCUTANEOUS EVERY 5 MIN PRN
Status: DISCONTINUED | OUTPATIENT
Start: 2022-08-29 | End: 2022-08-29

## 2022-08-29 RX ORDER — LIDOCAINE HYDROCHLORIDE 10 MG/ML
INJECTION, SOLUTION EPIDURAL; INFILTRATION; INTRACAUDAL; PERINEURAL AS NEEDED
Status: DISCONTINUED | OUTPATIENT
Start: 2022-08-29 | End: 2022-08-29 | Stop reason: SURG

## 2022-08-29 RX ORDER — SODIUM CHLORIDE, SODIUM LACTATE, POTASSIUM CHLORIDE, CALCIUM CHLORIDE 600; 310; 30; 20 MG/100ML; MG/100ML; MG/100ML; MG/100ML
INJECTION, SOLUTION INTRAVENOUS CONTINUOUS
Status: DISCONTINUED | OUTPATIENT
Start: 2022-08-29 | End: 2022-08-29

## 2022-08-29 RX ORDER — LABETALOL HYDROCHLORIDE 5 MG/ML
INJECTION, SOLUTION INTRAVENOUS AS NEEDED
Status: DISCONTINUED | OUTPATIENT
Start: 2022-08-29 | End: 2022-08-29 | Stop reason: SURG

## 2022-08-29 RX ORDER — BUPIVACAINE HYDROCHLORIDE AND EPINEPHRINE 5; 5 MG/ML; UG/ML
INJECTION, SOLUTION EPIDURAL; INTRACAUDAL; PERINEURAL AS NEEDED
Status: DISCONTINUED | OUTPATIENT
Start: 2022-08-29 | End: 2022-08-29 | Stop reason: HOSPADM

## 2022-08-29 RX ORDER — HYDROMORPHONE HYDROCHLORIDE 1 MG/ML
0.6 INJECTION, SOLUTION INTRAMUSCULAR; INTRAVENOUS; SUBCUTANEOUS EVERY 5 MIN PRN
Status: DISCONTINUED | OUTPATIENT
Start: 2022-08-29 | End: 2022-08-29

## 2022-08-29 RX ORDER — GLYCOPYRROLATE 0.2 MG/ML
INJECTION, SOLUTION INTRAMUSCULAR; INTRAVENOUS AS NEEDED
Status: DISCONTINUED | OUTPATIENT
Start: 2022-08-29 | End: 2022-08-29 | Stop reason: SURG

## 2022-08-29 RX ORDER — HYDROCODONE BITARTRATE AND ACETAMINOPHEN 5; 325 MG/1; MG/1
2 TABLET ORAL ONCE AS NEEDED
Status: DISCONTINUED | OUTPATIENT
Start: 2022-08-29 | End: 2022-08-29

## 2022-08-29 RX ORDER — ACETAMINOPHEN 500 MG
1000 TABLET ORAL ONCE
Status: DISCONTINUED | OUTPATIENT
Start: 2022-08-29 | End: 2022-08-29 | Stop reason: HOSPADM

## 2022-08-29 RX ORDER — ROCURONIUM BROMIDE 10 MG/ML
INJECTION, SOLUTION INTRAVENOUS AS NEEDED
Status: DISCONTINUED | OUTPATIENT
Start: 2022-08-29 | End: 2022-08-29 | Stop reason: SURG

## 2022-08-29 RX ORDER — HYDROMORPHONE HYDROCHLORIDE 1 MG/ML
0.2 INJECTION, SOLUTION INTRAMUSCULAR; INTRAVENOUS; SUBCUTANEOUS EVERY 5 MIN PRN
Status: DISCONTINUED | OUTPATIENT
Start: 2022-08-29 | End: 2022-08-29

## 2022-08-29 RX ORDER — METOCLOPRAMIDE HYDROCHLORIDE 5 MG/ML
INJECTION INTRAMUSCULAR; INTRAVENOUS
Status: COMPLETED
Start: 2022-08-29 | End: 2022-08-29

## 2022-08-29 RX ORDER — DEXAMETHASONE SODIUM PHOSPHATE 10 MG/ML
INJECTION, SOLUTION INTRAMUSCULAR; INTRAVENOUS AS NEEDED
Status: DISCONTINUED | OUTPATIENT
Start: 2022-08-29 | End: 2022-08-29 | Stop reason: SURG

## 2022-08-29 RX ORDER — ACETAMINOPHEN 500 MG
1000 TABLET ORAL ONCE AS NEEDED
Status: DISCONTINUED | OUTPATIENT
Start: 2022-08-29 | End: 2022-08-29

## 2022-08-29 RX ORDER — METOPROLOL TARTRATE 5 MG/5ML
2.5 INJECTION INTRAVENOUS ONCE
Status: DISCONTINUED | OUTPATIENT
Start: 2022-08-29 | End: 2022-08-29

## 2022-08-29 RX ORDER — NEOSTIGMINE METHYLSULFATE 1 MG/ML
INJECTION, SOLUTION INTRAVENOUS AS NEEDED
Status: DISCONTINUED | OUTPATIENT
Start: 2022-08-29 | End: 2022-08-29 | Stop reason: SURG

## 2022-08-29 RX ORDER — ONDANSETRON 2 MG/ML
INJECTION INTRAMUSCULAR; INTRAVENOUS
Status: COMPLETED
Start: 2022-08-29 | End: 2022-08-29

## 2022-08-29 RX ORDER — METOCLOPRAMIDE HYDROCHLORIDE 5 MG/ML
10 INJECTION INTRAMUSCULAR; INTRAVENOUS EVERY 8 HOURS PRN
Status: DISCONTINUED | OUTPATIENT
Start: 2022-08-29 | End: 2022-08-29

## 2022-08-29 RX ORDER — HYDROCODONE BITARTRATE AND ACETAMINOPHEN 5; 325 MG/1; MG/1
1 TABLET ORAL ONCE AS NEEDED
Status: DISCONTINUED | OUTPATIENT
Start: 2022-08-29 | End: 2022-08-29

## 2022-08-29 RX ORDER — NALOXONE HYDROCHLORIDE 0.4 MG/ML
80 INJECTION, SOLUTION INTRAMUSCULAR; INTRAVENOUS; SUBCUTANEOUS AS NEEDED
Status: DISCONTINUED | OUTPATIENT
Start: 2022-08-29 | End: 2022-08-29

## 2022-08-29 RX ORDER — DIPHENHYDRAMINE HYDROCHLORIDE 50 MG/ML
12.5 INJECTION INTRAMUSCULAR; INTRAVENOUS AS NEEDED
Status: DISCONTINUED | OUTPATIENT
Start: 2022-08-29 | End: 2022-08-29

## 2022-08-29 RX ADMIN — DEXAMETHASONE SODIUM PHOSPHATE 10 MG: 10 INJECTION, SOLUTION INTRAMUSCULAR; INTRAVENOUS at 14:48:00

## 2022-08-29 RX ADMIN — SODIUM CHLORIDE, SODIUM LACTATE, POTASSIUM CHLORIDE, CALCIUM CHLORIDE: 600; 310; 30; 20 INJECTION, SOLUTION INTRAVENOUS at 14:33:00

## 2022-08-29 RX ADMIN — LIDOCAINE HYDROCHLORIDE 50 MG: 10 INJECTION, SOLUTION EPIDURAL; INFILTRATION; INTRACAUDAL; PERINEURAL at 14:37:00

## 2022-08-29 RX ADMIN — GLYCOPYRROLATE 0.4 MG: 0.2 INJECTION, SOLUTION INTRAMUSCULAR; INTRAVENOUS at 15:38:00

## 2022-08-29 RX ADMIN — LABETALOL HYDROCHLORIDE 5 MG: 5 INJECTION, SOLUTION INTRAVENOUS at 15:02:00

## 2022-08-29 RX ADMIN — NEOSTIGMINE METHYLSULFATE 3 MG: 1 INJECTION, SOLUTION INTRAVENOUS at 15:38:00

## 2022-08-29 RX ADMIN — ROCURONIUM BROMIDE 30 MG: 10 INJECTION, SOLUTION INTRAVENOUS at 14:37:00

## 2022-08-29 RX ADMIN — LABETALOL HYDROCHLORIDE 5 MG: 5 INJECTION, SOLUTION INTRAVENOUS at 15:26:00

## 2022-08-29 NOTE — ANESTHESIA PROCEDURE NOTES
Airway  Date/Time: 8/29/2022 2:46 PM  Urgency: elective      General Information and Staff    Patient location during procedure: OR  Anesthesiologist: Noe Martinez MD  Resident/CRNA: Ashley George CRNA  Performed: CRNA     Indications and Patient Condition  Indications for airway management: anesthesia  Spontaneous Ventilation: absent  Sedation level: deep  Preoxygenated: yes  Patient position: sniffing  Mask difficulty assessment: 2 - vent by mask + OA or adjuvant +/- NMBA    Final Airway Details  Final airway type: endotracheal airway      Successful airway: ETT  Cuffed: yes   Successful intubation technique: direct laryngoscopy  Facilitating devices/methods: anterior pressure/BURP  Endotracheal tube insertion site: oral  Blade: GlideScope  Blade size: #4  ETT size (mm): 7.0    Cormack-Lehane Classification: grade IIB - view of arytenoids or posterior of glottis only  Placement verified by: chest auscultation and capnometry   Measured from: lips  ETT to lips (cm): 21  Number of attempts at approach: 1  Ventilation between attempts: BVM  Number of other approaches attempted: 1    Other Attempts  Unsuccessful attempted airways: endotracheal tube  Unsuccessful attempted endotracheal techniques: direct laryngoscopy

## 2022-08-29 NOTE — H&P
HPI:    Latrell Godinez is a 77year old female who presents for evaluation of cholecystitis. Patient has had some intermittent epigastric and right upper quadrant abdominal pain over the past year. She recently underwent an ultrasound which confirmed multiple gallstones. She denies any symptoms at this time. No fever or chills. No history of jaundice or pancreatitis.     Past Medical History:   Diagnosis Date   Abdominal hernia 2017   Abdominal wall hernia 7/24/2017   Acute kidney failure, unspecified ([address] 3/4/2019   Anxiety state   Asthma   Breast cancer ([address] 2006   right breast   Breast cancer ([address] 4/2016   left breast   Calculus of kidney   Colon polyps   Deep vein thrombosis ([address]   1997   Diverticulosis of large intestine   Dry skin   Ductal carcinoma in situ of breast 2021   Ductal carcinoma in situ of breast 2016   Exposure to radiation   2006   Food intolerance   Lactose intolerant   Frequent use of laxatives 2019   Hemorrhoids 1995   History of blood clots 1997   right leg   History of blood transfusion   12/2015   HYPERLIPIDEMIA   Kidney stone   Leg swelling 2015   Osteoarthritis   OSTEOPENIA   PONV (postoperative nausea and vomiting)   Renal disorder   CKD   SEASONAL ALLERGIES   Shingles 3/2011   SINUSITIS   Uncomfortable fullness after meals 2017   Visual impairment   contacts and glasses   Vitamin D deficiency 8/3/2011   Wears glasses 1956     Past Surgical History:   Procedure Laterality Date   BOWEL RESECTION December, 2015   ostomy bag - will be reversed   COLONOSCOPY 12/1/2006   POLYPS, RPT 5 YRS   COLONOSCOPY 09/17/13   Eri Mcnamara (rpt 3 yrs)   COLONOSCOPY N/A 4/26/2016   Procedure: COLONOSCOPY; Surgeon: Tereso Ruiz MD; Location: [facility] ENDOSCOPY   COLONOSCOPY N/A 6/8/2021   Procedure: COLONOSCOPY with forceps and cold snare polypectomy; Surgeon: Mireille Carson MD; Location: [facility] ENDOSCOPY   CYST ASPIRATION LEFT   CYST ASPIRATION RIGHT   CYSTOSCOPY,URETEROSCOPY,LITHOTRIPSY Left 12/12/15 cysto, left URS, laser litho, stent placement, GAVIN, EDW   D & C   EGD 02/28/17   [place] Hiatal hernia   HERNIA SURGERY January, 2017   surgical removal July, 2017   KNEE REPLACEMENT SURGERY   LEFT KNEE   LUMPECTOMY LEFT 4-2016   INVASIVE DUCTAL   LUMPECTOMY LEFT 01/2021   invasive ductal   LUMPECTOMY RIGHT 2-2006, 4-2006 02-06 re excision 04-06 rad   GERONIMO BIOPSY STEREO NODULE 2 SITE BILAT (CPT=19081/82430) 2000   BENIGN   GERONIMO BIOPSY STEREO NODULE 2 SITE BILAT (CPT=19081/82444) 2016   INVASIVE DUCTAL CA   NEEDLE BIOPSY LEFT Left 11/10/2020   IDC, DCIS   OTHER 6/2/16   colostomy reversal   OTHER SURGICAL HISTORY   LEFT FOOT NEUROMA RESECTION   OTHER SURGICAL HISTORY   BREAST CANCER EXCISION W/RE-EXCISION & LUMPECTOMY [id number] LYMPH NODE BIOPSIES POSITIVE 2 OF 5 NODES APRIL 2006   OTHER SURGICAL HISTORY 01/04/2021   Wire loc and left breast lumpectomy   PART REMOVAL COLON W END COLOSTOMY   COLOSTOMY REVERSAL   RADIATION LEFT 2016   RADIATION RIGHT 2006   TONSILLECTOMY   US FNA LYMPH NODE, GUIDE INCLD, LEFT (CPT=10005)     Current Outpatient Medications   Medication Sig Dispense Refill   anastrozole 1 MG Oral Tab tab Take 1 tablet (1 mg total) by mouth daily. 90 tablet 3   FUROSEMIDE 20 MG Oral Tab TAKE 1 TABLET BY MOUTH TWICE A  tablet 1   omeprazole 20 MG Oral Capsule Delayed Release Take 20 mg by mouth every morning. 90 capsule 3   docusate sodium 100 MG Oral Cap Take 300 mg by mouth nightly. Propranolol HCl 10 MG Oral Tab Take 1 tablet (10 mg total) by mouth 3 (three) times daily as needed. 270 tablet 1   loratadine 10 MG Oral Tab Take 10 mg by mouth daily. Potassium 99 MG Oral Tab Take 1 tablet by mouth daily. GETS EQUAL AMOUNT FROM CRYSTAL LITE   Cholecalciferol (VITAMIN D) 2000 units Oral Cap Take 2,000 Units by mouth daily. magnesium oxide (MAG-OX) 400 MG Oral Tab Take 1 tablet (400 mg total) by mouth 2 (two) times daily.  30 tablet 0     Opioid Analgesics NAUSEA AND VOMITING  Comment:Pt reports she tolerates Norco low dose  Prednisone DIARRHEA, DIZZINESS, NAUSEA ONLY  Sulfa Antibiotics HIVES  Seasonal Runny nose    Family History   Problem Relation Age of Onset   Diabetes Father   77   Hypertension Father   61   Heart Attack Father   80   Other (Other) Father   Diabetes Maternal Grandmother   Cancer Paternal Grandfather 80   COLON   Colon Cancer Paternal Grandfather   80   Diabetes Paternal Grandmother   Heart Disorder Paternal Grandmother   Breast Cancer Self 61   DCIS Self   Breast Cancer Paternal Aunt 48   estimate   Other (Other) Sister   lupus   Breast Cancer Paternal Aunt   76   Stroke Maternal Aunt   79     Social History  Tobacco Use  Smoking status: Former  Packs/day: 0.50  Years: 14.00  Pack years: 7  Types: Cigarettes  Quit date: 1980  Years since quittin.6  Smokeless tobacco: Never  Tobacco comments: 10 cigarettes per day  Vaping Use  Vaping Use: Never used  Alcohol use: No  Drug use: No    ROS:    10 point review of systems performed with pertinent positives and negatives per history of present illness    GENERAL: well developed, well nourished female, in no apparent distress  SKIN: anicteric  HEENT: normocephalic, sclera aniteric  NECK: supple, no JVD  RESPIRATORY: clear to auscultation  CARDIOVASCULAR: RRR  ABDOMEN: normal active BS, soft and no tenderness, no mass, well-healed surgical scars. LYMPHATIC: no lymphadenopathy  EXTREMITIES: no cyanosis or edema    IMPRESSION/PLAN:    Cholecystitis with cholelithiasis: I recommend a laparoscopic cholecystectomy. Risk, benefits and alternatives were discussed in detail. We also discussed the possibility of an open conversion in light of her previous abdominal surgeries and mesh repairs. We also discussed possibility of bile leak post surgery. She voiced understanding and willing to proceed.     2. Previous abdominal wall hernia repairs with mesh: Patient has had an intraperitoneal mesh repair in the left lower quadrant and onlay mesh over her lower abdomen. 3. History of perforated diverticulitis with low anterior colon resection and colostomy with subsequent takedown    4.  History of breast cancer

## 2022-08-29 NOTE — OPERATIVE REPORT
Report of 280Raymon Colon Patient Status:  Hospital Outpatient Surgery    1945 MRN [id number]   [facility] SURGERY Attending Delroy Elam MD   [address] Day # 0 PCP William Vanessa MD         2022    [place]wood    PRE-OPERATIVE DIAGNOSIS:                      Cholecystitis with cholelithiasis    POST-OPERATIVE DIAGNOSIS:                    Cholecystitis with cholelithiasis  Extensive intra-abdominal adhesions      PROCEDURE:                                                Laparoscopic cholecystectomy with lysis of adhesions    SURGEON:                                                    Alo Alexandre MD    ASSISTANT:                                                  Holger Almonte sa    ANESTHESIA:                                                General    EBL:                                                               5cc          PROCEDURE:                 Patient was taken to the operating room after informed consent and proper identification. Placed on the operating room table and administered a general anesthetic. Patient's abdomen was prepped and draped in usual sterile fashion. A supra umbilical incision was performed with a #11 scalpel. Veress needle was introduced into the peritoneal cavity and a pneumoperitoneum was created. 11mm millimeter trocar was introduced into the umbilical incision and a laparoscope was introduced. Patient's entire abdominal wall revealed adhesions of small bowel and colon with mesentery. A window was able to be encountered in this with blunt dissection advancing the scope into the upper abdomen. A 5 mm port was placed in the epigastric region and 2 other 5 mm trochars were placed in the right upper abdomen. Adhesions of the liver were taken down sharply with laparoscopic scissors. The gallbladder was grasped and retracted over the liver edge.  The cystic duct was identified clipped and divided. The cystic artery and its branches were identified clipped and divided. The gallbladder was removed from the liver edge by taking down its peritoneal attachments with hook electrocautery. Gallbladder was then delivered through the umbilical incision. All ports were re-introduced into the abdominal cavity. Gallbladder fossa and right gutter was irrigated until clear and hemostasis was achieved. The pneumoperitoneum was decompressed with suction and all ports removed. The umbilical fascia was closed with 0 Vicryl. All skin incisions were closed with 4-0 Vicryl in a subcuticular fashion. Dermabond was then applied to the surface of the incision. Patient tolerated the procedure well. The instrument and sponge count was correct after surgery.     Seema Simon MD  8/29/2022

## 2022-10-12 ENCOUNTER — HOSPITAL ENCOUNTER (OUTPATIENT)
Dept: MAMMOGRAPHY | Facility: HOSPITAL | Age: 77
Discharge: HOME | End: 2022-10-12
Attending: INTERNAL MEDICINE
Payer: MEDICARE

## 2022-10-12 DIAGNOSIS — Z17.0 MALIGNANT NEOPLASM OF RIGHT BREAST IN FEMALE, ESTROGEN RECEPTOR POSITIVE, UNSPECIFIED SITE OF BREAST (HCC): ICD-10-CM

## 2022-10-12 DIAGNOSIS — C50.312 MALIGNANT NEOPLASM OF LOWER-INNER QUADRANT OF LEFT BREAST IN FEMALE, ESTROGEN RECEPTOR POSITIVE (HCC): ICD-10-CM

## 2022-10-12 DIAGNOSIS — C50.911 MALIGNANT NEOPLASM OF RIGHT BREAST IN FEMALE, ESTROGEN RECEPTOR POSITIVE, UNSPECIFIED SITE OF BREAST (HCC): ICD-10-CM

## 2022-10-12 DIAGNOSIS — Z17.0 MALIGNANT NEOPLASM OF LOWER-INNER QUADRANT OF LEFT BREAST IN FEMALE, ESTROGEN RECEPTOR POSITIVE (HCC): ICD-10-CM

## 2022-10-12 PROCEDURE — 77061 BREAST TOMOSYNTHESIS UNI: CPT | Performed by: INTERNAL MEDICINE

## 2022-10-12 PROCEDURE — 77065 DX MAMMO INCL CAD UNI: CPT | Performed by: INTERNAL MEDICINE

## 2022-10-28 ENCOUNTER — OFFICE VISIT (OUTPATIENT)
Dept: HEMATOLOGY/ONCOLOGY | Age: 77
End: 2022-10-28
Attending: INTERNAL MEDICINE
Payer: MEDICARE

## 2022-10-28 VITALS
BODY MASS INDEX: 29 KG/M2 | TEMPERATURE: 96 F | DIASTOLIC BLOOD PRESSURE: 69 MMHG | HEART RATE: 67 BPM | RESPIRATION RATE: 18 BRPM | SYSTOLIC BLOOD PRESSURE: 179 MMHG | OXYGEN SATURATION: 96 % | WEIGHT: 174.38 LBS

## 2022-10-28 DIAGNOSIS — Z17.0 MALIGNANT NEOPLASM OF LOWER-INNER QUADRANT OF LEFT BREAST IN FEMALE, ESTROGEN RECEPTOR POSITIVE (HCC): Primary | ICD-10-CM

## 2022-10-28 DIAGNOSIS — C50.312 MALIGNANT NEOPLASM OF LOWER-INNER QUADRANT OF LEFT BREAST IN FEMALE, ESTROGEN RECEPTOR POSITIVE (HCC): Primary | ICD-10-CM

## 2022-10-28 DIAGNOSIS — Z17.0 MALIGNANT NEOPLASM OF RIGHT BREAST IN FEMALE, ESTROGEN RECEPTOR POSITIVE, UNSPECIFIED SITE OF BREAST (HCC): ICD-10-CM

## 2022-10-28 DIAGNOSIS — C50.911 MALIGNANT NEOPLASM OF RIGHT BREAST IN FEMALE, ESTROGEN RECEPTOR POSITIVE, UNSPECIFIED SITE OF BREAST (HCC): ICD-10-CM

## 2022-10-28 PROCEDURE — 99214 OFFICE O/P EST MOD 30 MIN: CPT | Performed by: INTERNAL MEDICINE

## 2022-10-28 NOTE — PROGRESS NOTES
Patient is here for follow up for breast cancer on anastrozole. She denies hot flashes. She had cholecystectomy in August and is recovering well. She has been introducing foods slowly since surgery. She denies any fever, cough or shortness of breath.       Education Record    Learner:  Patient    Disease / Diagnosis: Breast cancer  Barriers / Limitations:  None   Comments:    Method:  Discussion   Comments:    General Topics:  Side effects and symptom management   Comments:    Outcome:  Shows understanding   Comments:

## 2022-11-24 ENCOUNTER — APPOINTMENT (OUTPATIENT)
Dept: CT IMAGING | Age: 77
End: 2022-11-24
Attending: EMERGENCY MEDICINE
Payer: MEDICARE

## 2022-11-24 ENCOUNTER — HOSPITAL ENCOUNTER (EMERGENCY)
Age: 77
Discharge: HOME | End: 2022-11-24
Attending: EMERGENCY MEDICINE
Payer: MEDICARE

## 2022-11-24 VITALS
HEART RATE: 105 BPM | RESPIRATION RATE: 22 BRPM | TEMPERATURE: 99 F | DIASTOLIC BLOOD PRESSURE: 57 MMHG | OXYGEN SATURATION: 100 % | BODY MASS INDEX: 29.5 KG/M2 | HEIGHT: 64 IN | WEIGHT: 172.81 LBS | SYSTOLIC BLOOD PRESSURE: 150 MMHG

## 2022-11-24 DIAGNOSIS — S00.83XA CONTUSION OF FOREHEAD, INITIAL ENCOUNTER: Primary | ICD-10-CM

## 2022-11-24 PROCEDURE — 99284 EMERGENCY DEPT VISIT MOD MDM: CPT

## 2022-11-24 PROCEDURE — 70450 CT HEAD/BRAIN W/O DYE: CPT | Performed by: EMERGENCY MEDICINE

## 2022-11-25 NOTE — ED INITIAL ASSESSMENT (HPI)
Pt to ed from home with c/o a fall today, pain and injury to l eye, vision ok, denies loc, alert and talking, pain to l leg.

## 2023-04-20 DIAGNOSIS — R07.9 CHEST PAIN, UNSPECIFIED TYPE: ICD-10-CM

## 2023-04-20 RX ORDER — PROPRANOLOL HYDROCHLORIDE 10 MG/1
10 TABLET ORAL 3 TIMES DAILY PRN
Qty: 180 TABLET | Refills: 0 | Status: SHIPPED | OUTPATIENT
Start: 2023-04-20

## 2023-04-21 ENCOUNTER — HOSPITAL ENCOUNTER (OUTPATIENT)
Dept: MAMMOGRAPHY | Age: 78
Discharge: HOME | End: 2023-04-21
Attending: INTERNAL MEDICINE
Payer: MEDICARE

## 2023-04-21 DIAGNOSIS — Z17.0 MALIGNANT NEOPLASM OF LOWER-INNER QUADRANT OF LEFT BREAST IN FEMALE, ESTROGEN RECEPTOR POSITIVE (HCC): ICD-10-CM

## 2023-04-21 DIAGNOSIS — Z17.0 MALIGNANT NEOPLASM OF RIGHT BREAST IN FEMALE, ESTROGEN RECEPTOR POSITIVE, UNSPECIFIED SITE OF BREAST (HCC): ICD-10-CM

## 2023-04-21 DIAGNOSIS — C50.312 MALIGNANT NEOPLASM OF LOWER-INNER QUADRANT OF LEFT BREAST IN FEMALE, ESTROGEN RECEPTOR POSITIVE (HCC): ICD-10-CM

## 2023-04-21 DIAGNOSIS — C50.911 MALIGNANT NEOPLASM OF RIGHT BREAST IN FEMALE, ESTROGEN RECEPTOR POSITIVE, UNSPECIFIED SITE OF BREAST (HCC): ICD-10-CM

## 2023-04-21 PROCEDURE — 77062 BREAST TOMOSYNTHESIS BI: CPT | Performed by: INTERNAL MEDICINE

## 2023-04-21 PROCEDURE — 77066 DX MAMMO INCL CAD BI: CPT | Performed by: INTERNAL MEDICINE

## 2023-04-28 ENCOUNTER — OFFICE VISIT (OUTPATIENT)
Dept: HEMATOLOGY/ONCOLOGY | Age: 78
End: 2023-04-28
Attending: INTERNAL MEDICINE
Payer: MEDICARE

## 2023-04-28 VITALS
SYSTOLIC BLOOD PRESSURE: 152 MMHG | RESPIRATION RATE: 18 BRPM | BODY MASS INDEX: 30 KG/M2 | HEART RATE: 76 BPM | OXYGEN SATURATION: 96 % | DIASTOLIC BLOOD PRESSURE: 71 MMHG | WEIGHT: 176.81 LBS

## 2023-04-28 DIAGNOSIS — Z17.0 MALIGNANT NEOPLASM OF LOWER-INNER QUADRANT OF LEFT BREAST IN FEMALE, ESTROGEN RECEPTOR POSITIVE (HCC): Primary | ICD-10-CM

## 2023-04-28 DIAGNOSIS — Z17.0 MALIGNANT NEOPLASM OF RIGHT BREAST IN FEMALE, ESTROGEN RECEPTOR POSITIVE, UNSPECIFIED SITE OF BREAST (HCC): ICD-10-CM

## 2023-04-28 DIAGNOSIS — C50.911 MALIGNANT NEOPLASM OF RIGHT BREAST IN FEMALE, ESTROGEN RECEPTOR POSITIVE, UNSPECIFIED SITE OF BREAST (HCC): ICD-10-CM

## 2023-04-28 DIAGNOSIS — C50.312 MALIGNANT NEOPLASM OF LOWER-INNER QUADRANT OF LEFT BREAST IN FEMALE, ESTROGEN RECEPTOR POSITIVE (HCC): Primary | ICD-10-CM

## 2023-04-28 PROCEDURE — 99213 OFFICE O/P EST LOW 20 MIN: CPT | Performed by: INTERNAL MEDICINE

## 2023-04-28 NOTE — PROGRESS NOTES
Patient is here for follow up for breast cancer on anastrozole. She denies any side effects, no hot flashes. She denies any fever, cough or shortness of breath. Her appetite and energy are good.       Education Record    Learner:  Patient    Disease / Diagnosis: Breast cancer    Barriers / Limitations:  None   Comments:    Method:  Discussion   Comments:    General Topics:  Side effects and symptom management   Comments:    Outcome:  Shows understanding   Comments:

## 2023-07-12 ENCOUNTER — LAB ENCOUNTER (OUTPATIENT)
Dept: LAB | Age: 78
End: 2023-07-12
Attending: INTERNAL MEDICINE
Payer: MEDICARE

## 2023-07-12 DIAGNOSIS — E55.9 VITAMIN D DEFICIENCY: ICD-10-CM

## 2023-07-12 DIAGNOSIS — N18.31 STAGE 3A CHRONIC KIDNEY DISEASE (HCC): ICD-10-CM

## 2023-07-12 LAB
ANION GAP SERPL CALC-SCNC: 4 MMOL/L (ref 0–18)
BASOPHILS # BLD AUTO: 0.03 X10(3) UL (ref 0–0.2)
BASOPHILS NFR BLD AUTO: 0.4 %
BILIRUB UR QL STRIP.AUTO: NEGATIVE
BUN BLD-MCNC: 30 MG/DL (ref 7–18)
CALCIUM BLD-MCNC: 9.6 MG/DL (ref 8.5–10.1)
CHLORIDE SERPL-SCNC: 106 MMOL/L (ref 98–112)
CLARITY UR REFRACT.AUTO: CLEAR
CO2 SERPL-SCNC: 30 MMOL/L (ref 21–32)
COLOR UR AUTO: COLORLESS
CREAT BLD-MCNC: 1.21 MG/DL
EOSINOPHIL # BLD AUTO: 0.19 X10(3) UL (ref 0–0.7)
EOSINOPHIL NFR BLD AUTO: 2.8 %
ERYTHROCYTE [DISTWIDTH] IN BLOOD BY AUTOMATED COUNT: 13.8 %
FASTING STATUS PATIENT QL REPORTED: YES
GFR SERPLBLD BASED ON 1.73 SQ M-ARVRAT: 46 ML/MIN/1.73M2 (ref 60–?)
GLUCOSE BLD-MCNC: 137 MG/DL (ref 70–99)
GLUCOSE UR STRIP.AUTO-MCNC: NEGATIVE MG/DL
HCT VFR BLD AUTO: 40.6 %
HGB BLD-MCNC: 13.1 G/DL
IMM GRANULOCYTES # BLD AUTO: 0.01 X10(3) UL (ref 0–1)
IMM GRANULOCYTES NFR BLD: 0.1 %
KETONES UR STRIP.AUTO-MCNC: NEGATIVE MG/DL
LEUKOCYTE ESTERASE UR QL STRIP.AUTO: NEGATIVE
LYMPHOCYTES # BLD AUTO: 1.97 X10(3) UL (ref 1–4)
LYMPHOCYTES NFR BLD AUTO: 29.1 %
MAGNESIUM SERPL-MCNC: 2.4 MG/DL (ref 1.6–2.6)
MCH RBC QN AUTO: 28.2 PG (ref 26–34)
MCHC RBC AUTO-ENTMCNC: 32.3 G/DL (ref 31–37)
MCV RBC AUTO: 87.3 FL
MONOCYTES # BLD AUTO: 0.83 X10(3) UL (ref 0.1–1)
MONOCYTES NFR BLD AUTO: 12.3 %
NEUTROPHILS # BLD AUTO: 3.73 X10 (3) UL (ref 1.5–7.7)
NEUTROPHILS # BLD AUTO: 3.73 X10(3) UL (ref 1.5–7.7)
NEUTROPHILS NFR BLD AUTO: 55.3 %
NITRITE UR QL STRIP.AUTO: NEGATIVE
OSMOLALITY SERPL CALC.SUM OF ELEC: 298 MOSM/KG (ref 275–295)
PH UR STRIP.AUTO: 7 [PH] (ref 5–8)
PHOSPHATE SERPL-MCNC: 3.6 MG/DL (ref 2.5–4.9)
PLATELET # BLD AUTO: 200 10(3)UL (ref 150–450)
POTASSIUM SERPL-SCNC: 4.4 MMOL/L (ref 3.5–5.1)
PROT UR STRIP.AUTO-MCNC: NEGATIVE MG/DL
PTH-INTACT SERPL-MCNC: 70.7 PG/ML (ref 18.5–88)
RBC # BLD AUTO: 4.65 X10(6)UL
RBC UR QL AUTO: NEGATIVE
SODIUM SERPL-SCNC: 140 MMOL/L (ref 136–145)
SP GR UR STRIP.AUTO: 1 (ref 1–1.03)
UROBILINOGEN UR STRIP.AUTO-MCNC: <2 MG/DL
WBC # BLD AUTO: 6.8 X10(3) UL (ref 4–11)

## 2023-07-12 PROCEDURE — 85025 COMPLETE CBC W/AUTO DIFF WBC: CPT

## 2023-07-12 PROCEDURE — 83970 ASSAY OF PARATHORMONE: CPT

## 2023-07-12 PROCEDURE — 81003 URINALYSIS AUTO W/O SCOPE: CPT

## 2023-07-12 PROCEDURE — 84100 ASSAY OF PHOSPHORUS: CPT

## 2023-07-12 PROCEDURE — 83735 ASSAY OF MAGNESIUM: CPT

## 2023-07-12 PROCEDURE — 80048 BASIC METABOLIC PNL TOTAL CA: CPT

## 2023-07-12 PROCEDURE — 36415 COLL VENOUS BLD VENIPUNCTURE: CPT

## 2023-07-18 ENCOUNTER — OFFICE VISIT (OUTPATIENT)
Dept: NEPHROLOGY | Facility: CLINIC | Age: 78
End: 2023-07-18
Payer: MEDICARE

## 2023-07-18 VITALS — SYSTOLIC BLOOD PRESSURE: 140 MMHG | DIASTOLIC BLOOD PRESSURE: 70 MMHG | WEIGHT: 170 LBS | BODY MASS INDEX: 29 KG/M2

## 2023-07-18 DIAGNOSIS — N18.30 STAGE 3 CHRONIC KIDNEY DISEASE, UNSPECIFIED WHETHER STAGE 3A OR 3B CKD (HCC): Primary | ICD-10-CM

## 2023-07-18 DIAGNOSIS — E55.9 VITAMIN D DEFICIENCY DUE TO CHRONIC KIDNEY DISEASE: ICD-10-CM

## 2023-07-18 DIAGNOSIS — N18.9 VITAMIN D DEFICIENCY DUE TO CHRONIC KIDNEY DISEASE: ICD-10-CM

## 2023-07-18 PROCEDURE — 99213 OFFICE O/P EST LOW 20 MIN: CPT | Performed by: INTERNAL MEDICINE

## 2023-07-18 RX ORDER — LISINOPRIL 2.5 MG/1
5 TABLET ORAL DAILY
Qty: 30 TABLET | Refills: 11 | Status: SHIPPED | OUTPATIENT
Start: 2023-07-18

## 2023-07-18 NOTE — PROGRESS NOTES
Nephrology Progress Note      Tahir Craig is a 69year old female. HPI:   No chief complaint on file. 69year old female with hx of breast ca, nephrolithasis, CKD - stage III here for follow up. Patient's kidney stone was managed by urology -lithotripsy + s/p L uretral stent - removed in early January 2016. Has some chronic LE edema which is unchanged     Denies n/v  NO cp/sob     No hematuria  NO flank pain  Denies f/c       Medications (Active prior to today's visit):  Current Outpatient Medications   Medication Sig Dispense Refill    propranolol 10 MG Oral Tab Take 1 tablet (10 mg total) by mouth 3 (three) times daily as needed. NOT FOR  tablet 0    ANASTROZOLE 1 MG Oral Tab tab TAKE 1 TABLET BY MOUTH EVERY DAY 90 tablet 2    Biotin 5000 MCG Oral Tab Take by mouth. CRANBERRY OR Take by mouth. furosemide 20 MG Oral Tab Take 1 tablet (20 mg total) by mouth daily. (Patient taking differently: Take 1 tablet (20 mg total) by mouth daily. RETENTION NOT FOR HTN) 180 tablet 4    omeprazole 20 MG Oral Capsule Delayed Release Take 1 capsule 3 times a week. 90 capsule 3    docusate sodium 100 MG Oral Cap Take 3 capsules (300 mg total) by mouth nightly. loratadine 10 MG Oral Tab Take 1 tablet (10 mg total) by mouth daily. Potassium 99 MG Oral Tab Take 1 tablet by mouth daily. Cholecalciferol (VITAMIN D) 2000 units Oral Cap Take 1 capsule (2,000 Units total) by mouth daily. magnesium oxide (MAG-OX) 400 MG Oral Tab Take 1 tablet (400 mg total) by mouth 2 (two) times daily.  (Patient taking differently: Take 1 tablet (400 mg total) by mouth daily.) 30 tablet 0       Allergies:    Opioid Analgesics       NAUSEA AND VOMITING    Comment:Pt reports she tolerates Norco low dose  Prednisone              DIARRHEA, DIZZINESS, NAUSEA ONLY  Sulfa Antibiotics       HIVES  Seasonal                Runny nose      ROS:      See above; 12 systems reviewed and otherwise unremarkable PHYSICAL EXAM:   LMP  (LMP Unknown)   Wt Readings from Last 6 Encounters:  04/28/23 : 176 lb 12.8 oz (80.2 kg)  11/24/22 : 172 lb 13.5 oz (78.4 kg)  10/28/22 : 174 lb 6.4 oz (79.1 kg)  08/29/22 : 176 lb (79.8 kg)  06/07/22 : 176 lb (79.8 kg)  04/13/22 : 176 lb (79.8 kg)       General: Alert and oriented in no apparent distress. HEENT: No scleral icterus, MMM  Neck: Supple, no LLUVIA or thyromegaly  Cardiac: Regular rate and rhythm, S1, S2 normal, no murmur or rub  Lungs: Clear without wheezes, rales, rhonchi. Abdomen: Soft, non-tender. + bowel sounds, no palpable organomegaly  Extremities: Without clubbing, cyanosis; trace edema   Neurologic: Alert and oriented, normal affect, cranial nerves grossly intact, moving all extremities  Skin: Warm and dry, no rashes      Component      Latest Ref Rn 7/12/2023   WBC      4.0 - 11.0 x10(3) uL 6.8    RBC      3.80 - 5.30 x10(6)uL 4.65    Hemoglobin      12.0 - 16.0 g/dL 13.1    Hematocrit      35.0 - 48.0 % 40.6    Platelet Count      108.5 - 450.0 10(3)uL 200.0    MCV      80.0 - 100.0 fL 87.3    MCH      26.0 - 34.0 pg 28.2    MCHC      31.0 - 37.0 g/dL 32.3    RDW      % 13.8    Prelim Neutrophil Abs      1.50 - 7.70 x10 (3) uL 3.73    Neutrophils Absolute      1.50 - 7.70 x10(3) uL 3.73    Lymphocytes Absolute      1.00 - 4.00 x10(3) uL 1.97    Monocytes Absolute      0.10 - 1.00 x10(3) uL 0.83    Eosinophils Absolute      0.00 - 0.70 x10(3) uL 0.19    Basophils Absolute      0.00 - 0.20 x10(3) uL 0.03    Immature Granulocyte Absolute      0.00 - 1.00 x10(3) uL 0.01    Neutrophils %      % 55.3    Lymphocytes %      % 29.1    Monocytes %      % 12.3    Eosinophils %      % 2.8    Basophils %      % 0.4    Immature Granulocyte %      % 0.1    Color Urine      Yellow  Colorless !     Clarity Urine      Clear  Clear    Spec Gravity      1.001 - 1.030  1.004    Glucose Urine      Negative mg/dL Negative    Bilirubin Urine      Negative  Negative    Ketones, UA Negative mg/dL Negative    Blood Urine      Negative  Negative    PH Urine      5.0 - 8.0  7.0    Protein Urine      Negative mg/dL Negative    Urobilinogen Urine      <2.0 mg/dL <2.0    Nitrite Urine      Negative  Negative    Leukocyte Esterase       Negative  Negative    Microscopic Microscopic not indicated    Glucose      70 - 99 mg/dL 137 (H)    Sodium      136 - 145 mmol/L 140    Potassium      3.5 - 5.1 mmol/L 4.4    Chloride      98 - 112 mmol/L 106    Carbon Dioxide, Total      21.0 - 32.0 mmol/L 30.0    ANION GAP      0 - 18 mmol/L 4    BUN      7 - 18 mg/dL 30 (H)    CREATININE      0.55 - 1.02 mg/dL 1.21 (H)    CALCIUM      8.5 - 10.1 mg/dL 9.6    CALCULATED OSMOLALITY      275 - 295 mOsm/kg 298 (H)    eGFR-Cr      >=60 mL/min/1.73m2 46 (L)    Patient Fasting for BMP? Yes    PHOSPHORUS      2.5 - 4.9 mg/dL 3.6    PTH INTACT      18.5 - 88.0 pg/mL 70.7    Magnesium, Serum      1.6 - 2.6 mg/dL 2.4       Legend:  ! Abnormal  (H) High  (L) Low    CT Abd 5/2017 - w/ bilateral non-obstructing calculi      ASSESSMENT/PLAN:     CKD- Stage III - Cr stable   - stable; continue lasix ; start low dose acei (risk benefits reviewed)  - reviewed [id number] - pt wants to hold off  Breast Ca  -  s/p lumpectomy early Jan 2021- follows w/ oncology   Hx of kidney stones -s/p  lithotripsy -Low citrate  - encouraged hydration  + adds lemon (citric acid)  to her water  Hypokalemia/Hypomag - stable   OA   LE edema - chronic ; suspect lymphedema due to prior knee surgeries. Reviewed mechanical care options - elevate legs/stockings.   ff.     F/U in 1 yr    Jay Red MD  7/18/2023

## 2023-09-14 RX ORDER — FUROSEMIDE 20 MG/1
20 TABLET ORAL DAILY
Qty: 90 TABLET | Refills: 3 | Status: SHIPPED | OUTPATIENT
Start: 2023-09-14

## 2023-09-18 RX ORDER — ANASTROZOLE 1 MG/1
1 TABLET ORAL DAILY
Qty: 90 TABLET | Refills: 2 | Status: SHIPPED | OUTPATIENT
Start: 2023-09-18

## 2023-10-06 DIAGNOSIS — R07.9 CHEST PAIN, UNSPECIFIED TYPE: ICD-10-CM

## 2023-10-06 RX ORDER — PROPRANOLOL HYDROCHLORIDE 10 MG/1
TABLET ORAL
Qty: 90 TABLET | Refills: 1 | Status: SHIPPED | OUTPATIENT
Start: 2023-10-06

## 2023-11-17 ENCOUNTER — OFFICE VISIT (OUTPATIENT)
Dept: HEMATOLOGY/ONCOLOGY | Age: 78
End: 2023-11-17
Attending: INTERNAL MEDICINE
Payer: MEDICARE

## 2023-11-17 VITALS
HEART RATE: 74 BPM | SYSTOLIC BLOOD PRESSURE: 179 MMHG | OXYGEN SATURATION: 91 % | RESPIRATION RATE: 18 BRPM | DIASTOLIC BLOOD PRESSURE: 76 MMHG | WEIGHT: 168 LBS | TEMPERATURE: 96 F | BODY MASS INDEX: 29 KG/M2

## 2023-11-17 DIAGNOSIS — C50.312 MALIGNANT NEOPLASM OF LOWER-INNER QUADRANT OF LEFT BREAST IN FEMALE, ESTROGEN RECEPTOR POSITIVE: Primary | ICD-10-CM

## 2023-11-17 DIAGNOSIS — Z78.0 ASYMPTOMATIC MENOPAUSE: ICD-10-CM

## 2023-11-17 DIAGNOSIS — Z17.0 MALIGNANT NEOPLASM OF LOWER-INNER QUADRANT OF LEFT BREAST IN FEMALE, ESTROGEN RECEPTOR POSITIVE: Primary | ICD-10-CM

## 2023-11-17 PROCEDURE — 99214 OFFICE O/P EST MOD 30 MIN: CPT | Performed by: INTERNAL MEDICINE

## 2023-11-17 NOTE — PROGRESS NOTES
Patient is here for follow up for breast cancer on anastrozole. She is tolerating this well. She denies hot flashes or other symptoms. She denies any fever, cough or shortness of breath. Her appetite and energy are good. She thinks that she need to exercise more.      Education Record    Learner:  Patient    Disease / Diagnosis: Breast cancer    Barriers / Limitations:  None   Comments:    Method:  Discussion   Comments:    General Topics:  Side effects and symptom management   Comments:    Outcome:  Shows understanding   Comments:

## 2024-01-19 NOTE — PROGRESS NOTES
Patient here for 6-month MD f/u breast cancer. States she is doing well. Her next mammogram is scheduled for May 4th. Voicing no new complaints.   Education Record  Learner:  Patient  Disease / Diagnosis:   Breast cancer  Barriers / Limitations:  None  M yes

## 2024-04-17 ENCOUNTER — HOSPITAL ENCOUNTER (OUTPATIENT)
Dept: BONE DENSITY | Age: 79
Discharge: HOME | End: 2024-04-17
Attending: INTERNAL MEDICINE
Payer: MEDICARE

## 2024-04-17 DIAGNOSIS — C50.312 MALIGNANT NEOPLASM OF LOWER-INNER QUADRANT OF LEFT BREAST IN FEMALE, ESTROGEN RECEPTOR POSITIVE (HCC): ICD-10-CM

## 2024-04-17 DIAGNOSIS — Z78.0 ASYMPTOMATIC MENOPAUSE: ICD-10-CM

## 2024-04-17 DIAGNOSIS — Z17.0 MALIGNANT NEOPLASM OF LOWER-INNER QUADRANT OF LEFT BREAST IN FEMALE, ESTROGEN RECEPTOR POSITIVE (HCC): ICD-10-CM

## 2024-04-17 PROCEDURE — 77080 DXA BONE DENSITY AXIAL: CPT | Performed by: INTERNAL MEDICINE

## 2024-05-03 DIAGNOSIS — R07.9 CHEST PAIN, UNSPECIFIED TYPE: ICD-10-CM

## 2024-05-03 RX ORDER — PROPRANOLOL HYDROCHLORIDE 10 MG/1
TABLET ORAL
Qty: 270 TABLET | Refills: 1 | Status: SHIPPED | OUTPATIENT
Start: 2024-05-03

## 2024-05-17 ENCOUNTER — OFFICE VISIT (OUTPATIENT)
Dept: HEMATOLOGY/ONCOLOGY | Age: 79
End: 2024-05-17
Attending: INTERNAL MEDICINE

## 2024-05-17 VITALS
BODY MASS INDEX: 29.37 KG/M2 | WEIGHT: 172 LBS | OXYGEN SATURATION: 93 % | DIASTOLIC BLOOD PRESSURE: 64 MMHG | TEMPERATURE: 98 F | RESPIRATION RATE: 16 BRPM | HEIGHT: 64.02 IN | SYSTOLIC BLOOD PRESSURE: 152 MMHG | HEART RATE: 62 BPM

## 2024-05-17 DIAGNOSIS — C50.312 MALIGNANT NEOPLASM OF LOWER-INNER QUADRANT OF LEFT BREAST IN FEMALE, ESTROGEN RECEPTOR POSITIVE (HCC): Primary | ICD-10-CM

## 2024-05-17 DIAGNOSIS — M85.89 OSTEOPENIA OF MULTIPLE SITES: ICD-10-CM

## 2024-05-17 DIAGNOSIS — Z17.0 MALIGNANT NEOPLASM OF LOWER-INNER QUADRANT OF LEFT BREAST IN FEMALE, ESTROGEN RECEPTOR POSITIVE (HCC): Primary | ICD-10-CM

## 2024-05-17 PROCEDURE — 99214 OFFICE O/P EST MOD 30 MIN: CPT | Performed by: INTERNAL MEDICINE

## 2024-05-17 RX ORDER — ALENDRONATE SODIUM 70 MG/1
70 TABLET ORAL
Qty: 12 TABLET | Refills: 3 | Status: SHIPPED | OUTPATIENT
Start: 2024-05-17

## 2024-05-17 NOTE — PROGRESS NOTES
Patient is here for follow up for breast cancer on anastrozole.  She is tolerating this well.  She denies any hot flashes or pain.   Her appetite and energy are good.   She had a dexa scan on 4/17.    She is feeling very well.     Education Record    Learner:  Patient    Disease / Diagnosis: breast cancer    Barriers / Limitations:  None   Comments:    Method:  Discussion   Comments:    General Topics:  Side effects and symptom management   Comments:    Outcome:  Shows understanding   Comments:

## 2024-05-17 NOTE — PROGRESS NOTES
Cancer Center Progress Note    Problem List:      Patient Active Problem List   Diagnosis    Breast cancer, right breast (HCC)    Colon polyps    Asthma (HCC)    Vitamin D deficiency    Osteoarthrosis, unspecified whether generalized or localized, lower leg    Kidney stone    Hypercalcemia    Cancer of overlapping sites of left female breast (HCC)    CKD (chronic kidney disease) stage 3, GFR 30-59 ml/min (HCC)    Gas bloat syndrome    Sciatica of right side    Primary osteoarthritis of right hip    Primary osteoarthritis of right knee/TKR sx 6-12-19/Global Exp 9-10-19/TRK/EP    History of DVT of lower extremity    Glucose intolerance    Decreased ROM of right knee       Interim History:    Tiera Carlos presents today for evaluation and management of a diagnosis of left breast cancer.    She is on anastrozole 1 mg daily. She is doing well on this. She has no new pain. She has no dyspnea or cough. She has no fever or sweats. Her energy level is good.    She had a lumpectomy on 1/4/2021. She had a 1.8 cm, grade I IDC. She has had a h/o left breast radiation.     She had a left breast lumpectomy for a 3 o'clock breast cancer on 5/9/2016. This showed a 0.9 cm invasive ductal carcinoma. The margins were negative. She had two negative SLN's. The ER and LA were negative (0%). The HER2 was negative. The Ki-67 was 20%. She now has completed radiation therapy on 8/18/2016. She had declined adjuvant chemotherapy.     She had a h/o right breast cancer with ER positive s/p five years of an AI.    Pathology from Lumpectomy on 1/4/2021:  Final Diagnosis:   Left breast, 8:00, lumpectomy:  -Invasive ductal carcinoma (1.8 cm), Grade 1 (Tubules: 3, Nuclear: 1, Mitoses: 1).      -The margins are negative for invasive carcinoma (more than 5 mm).  -Ductal carcinoma in situ, nuclear grade 1, solid and cribriform types.     -The margins are negative for DCIS (more than 2 mm).  -Focal atypical ductal hyperplasia.  -Prior biopsy  site changes.         Review of Systems:   The pertinent positives and negatives were described. All other systems were negative.    PMH/PSH:  Past Medical History:    Abdominal hernia    Abdominal wall hernia    Acute kidney failure, unspecified (HCC)    Anxiety state    Asthma (HCC)    Breast cancer (HCC)    right breast    Breast cancer (HCC)    left breast    Breast cancer (HCC)    left breast IDC;DCIS    Calculus of kidney    Colon polyps    Deep vein thrombosis (HCC)    1997, left leg    Diverticulosis of large intestine    Dry skin    Ductal carcinoma in situ of breast    Ductal carcinoma in situ of breast    Ductal carcinoma in situ of breast    Exposure to radiation    2006    Food intolerance    Lactose intolerant    Frequent use of laxatives    Hemorrhoids    History of blood clots    right leg    History of blood transfusion    12/2015    HYPERLIPIDEMIA    Kidney stone    Leg swelling    Osteoarthritis    OSTEOPENIA    Renal disorder    CKD    SEASONAL ALLERGIES    Shingles    SINUSITIS    Uncomfortable fullness after meals    Visual impairment    contacts and glasses    Vitamin D deficiency    Wears glasses       Past Surgical History:   Procedure Laterality Date    Bowel resection  12/2015    ostomy bag - will be reversed    Cholecystectomy      Colonoscopy  12/01/2006    POLYPS, RPT 5 YRS    Colonoscopy  09/17/2013    [facility]oto (rpt 3 yrs)    Colonoscopy N/A 04/26/2016    Procedure: COLONOSCOPY;  Surgeon: Randy Kowalski MD;  Location:  ENDOSCOPY    Colonoscopy N/A 06/08/2021    Procedure: COLONOSCOPY with forceps and cold snare  polypectomy;  Surgeon: Troy Cormier MD;  Location:  ENDOSCOPY    Cyst aspiration left      Cyst aspiration right      Cystoscopy,ureteroscopy,lithotripsy Left 12/12/2015    cysto, left URS, laser litho, stent placement, GAVIN, EDW    D & c      Egd  02/28/2017    Jevon Hiatal hernia    Hernia surgery  01/2017    surgical removal July, 2017    Knee replacement surgery       LEFT KNEE    Lumpectomy left  04/2016    INVASIVE DUCTAL    Lumpectomy left  01/2021    invasive ductal    Lumpectomy right  2-2006, 4-2006 02-06 re excision 04-06 rad     Jessica biopsy stereo nodule 2 site bilat (cpt=19081/97289)  2000    BENIGN    Jessica biopsy stereo nodule 2 site bilat (cpt=19081/54733)  2016    INVASIVE DUCTAL CA    Needle biopsy left Left 11/10/2020    IDC, DCIS    Other  06/02/2016    colostomy reversal    Other surgical history      LEFT FOOT NEUROMA RESECTION    Other surgical history      BREAST CANCER EXCISION W/RE-EXCISION & LUMPECTOMY [id number] [id number] NODE BIOPSIES POSITIVE 2 OF 5 NODES APRIL 2006    Other surgical history  01/04/2021    Wire loc and left breast lumpectomy    Part removal colon w end colostomy      COLOSTOMY REVERSAL    Radiation left  2016    Radiation right  2006    Tonsillectomy      Us fna lymph node, guide incld,  left (cpt=10005)         Family History Reviewed:  Family History   Problem Relation Age of Onset    Diabetes Father         66    Hypertension Father         60    Heart Attack Father         83    Other (Other) Father     Diabetes Maternal Grandmother     Cancer Paternal Grandfather 82        COLON    Colon Cancer Paternal Grandfather         82    Diabetes Paternal Grandmother     Heart Disorder Paternal Grandmother     Breast Cancer Self 59    DCIS Self     Breast Cancer Paternal Aunt 50        estimate    Other (Other) Sister         lupus    Breast Cancer Paternal Aunt         68    Stroke Maternal Aunt         70       Allergies:     Allergies   Allergen Reactions    Opioid Analgesics NAUSEA AND VOMITING     Pt reports she tolerates Norco low dose    Prednisone DIARRHEA, DIZZINESS and NAUSEA ONLY    Sulfa Antibiotics HIVES    Seasonal Runny nose       Medications:   propranolol 10 MG Oral Tab TAKE 1 TABLET BY MOUTH 3X DAILY AS NEEDED. NOT FOR HYPERTENSION 270 tablet 1    anastrozole 1 MG Oral Tab tab Take 1 tablet (1 mg total) by mouth daily. 90 tablet  2    furosemide 20 MG Oral Tab Take 1 tablet (20 mg total) by mouth daily. 90 tablet 3    Biotin 5000 MCG Oral Tab Take by mouth.      CRANBERRY OR Take by mouth.      omeprazole 20 MG Oral Capsule Delayed Release Take 1 capsule 3 times a week. 90 capsule 3    docusate sodium 100 MG Oral Cap Take 3 capsules (300 mg total) by mouth nightly.      loratadine 10 MG Oral Tab Take 1 tablet (10 mg total) by mouth daily.      Cholecalciferol (VITAMIN D) 2000 units Oral Cap Take 1 capsule (2,000 Units total) by mouth daily.      magnesium oxide (MAG-OX) 400 MG Oral Tab Take 1 tablet (400 mg total) by mouth 2 (two) times daily. (Patient taking differently: Take 1 tablet (400 mg total) by mouth daily.) 30 tablet 0         Vital Signs:      Height: 162.6 cm (5' 4.02\") (05/17 1028)  Weight: 78 kg (172 lb) (05/17 1028)  BSA (Calculated - sq m): 1.83 sq meters (05/17 1028)  Pulse: 62 (05/17 1028)  BP: 152/64 (05/17 1028)  Temp: 98.3 °F (36.8 °C) (05/17 1028)  Do Not Use - Resp Rate: --  SpO2: 93 % (05/17 1028)      Performance Status:  ECOG 0: Fully active, able to carry on all pre-disease performance without restriction    Physical Examination:    Constitutional: Patient is alert and not in acute distress.  Respiratory: Clear to auscultation and percussion. No rales.  No wheezes.  Cardiovascular: Regular rate and rhythm. No murmurs.  Breast: The right breast has no mass or skin lesion. The left breast has a palpable medial mass.  Gastrointestinal: Soft, non tender with good bowel sounds.  Musculoskeletal: No edema. No calf tenderness.  Neurological: Grossly intact without focal motor or sensory deficit.  Skin: No suspicious skin lesion, no rash, no ulceration.  Lymphatics: There is no palpable lymphadenopathy throughout in the cervical, supraclavicular, or axillary regions.  Psychiatric: The patient's mood is calm and appropriate for this visit.       Labs reviewed at this visit:     Lab Results   Component Value Date    WBC 6.8  07/12/2023    RBC 4.65 07/12/2023    HGB 13.1 07/12/2023    HCT 40.6 07/12/2023    MCV 87.3 07/12/2023    MCH 28.2 07/12/2023    MCHC 32.3 07/12/2023    RDW 13.8 07/12/2023    .0 07/12/2023     Lab Results   Component Value Date     07/12/2023    K 4.4 07/12/2023     07/12/2023    CO2 30.0 07/12/2023    BUN 30 (H) 07/12/2023    CREATSERUM 1.21 (H) 07/12/2023     (H) 07/12/2023    CA 9.6 07/12/2023    ALKPHO 65 06/18/2019    ALT 17 06/18/2019    AST 21 06/18/2019    BILT 0.89 06/18/2019    ALB 2.9 (L) 06/18/2019    TP 6.9 06/18/2019       Radiologic imaging reviewed at this visit:    Bone density dexa on 4/17/2024:  LUMBAR SPINE ANALYSIS RESULTS:      Bone mineral density (BMD) (g/cm2):  0.977    Lumbar T-Score:  -0.6      % young normals:  93      % age matched controls:  127      Change from prior spine examination:  -0.4%               TOTAL HIP ANALYSIS RESULTS:        Bone mineral density (BMD) (g/cm2):  0.694      Total Hip T-Score:  -2.0      % young normals:  74      % age matched controls:  98      Change from prior hip examination:  -11.2*%               FEMORAL NECK ANALYSIS RESULTS:        Bone mineral density (BMD) (g/cm2):  0.627      Femoral neck T-Score:  -2.0      % young normals:  74      % age matched controls:  104      Change from prior hip examination:  -10.2*%         Mammogram in 4/21/2023:  BREAST COMPOSITION:  Scattered areas fibroglandular density.       FINDINGS:  No suspicious mass or microcalcifications.  No significant change.  Stable bilateral lumpectomy and radiation changes.  Stable left breast postsurgical seroma.  Stable benign scattered calcifications bilaterally.        Impression   CONCLUSION:         BI-RADS CATEGORY:     DIAGNOSTIC CATEGORY 2--BENIGN FINDING NO CHANGE FROM COMPARISON ASSESSMENT.         RECOMMENDATIONS:     ROUTINE MAMMOGRAM AND CLINICAL EVALUATION IN 12 MONTHS.            Assessment/Plan:     New left breast cancer in the lower inner  breast:  Grade I  ER/%  Ki-67 4%  Her2 negative  Lumpectomy on 1/4/2021  1.8 cm IDC  Grade I    The patient has h/o bilateral lumpectomy and bilateral radiation. She has a low grade Stage IA breast cancer. She had lumpectomy. She has ROE on Anastrozole 1 mg daily. I will continue to see her at six month intervals. She will continue with mammogram follow up. She is overdue for the mammogram. She will schedule this now.    H/O Invasive ductal carcinoma of the left breast in overlapping quadrants (3 o'clock):  ER 0%  AZ 0%  Her2 not amplified  Ki-67 22%  Lumpectomy on 5/9/2016  0.9 cm cancer  0/2 SLNs  Left breast radiation completed on 8/18/2016     She has ROE of the ER negative small cancer.     Right breast cancer:     She has had ROE.  She completed five years of an aromatase inhibitor in 2011.     Chronic kidney disease:    Overall stable renal function.    Osteopenia:    She has worsening osteopenia in the hip and femur. I will start alendronate 70 mg PO weekly. She will continue with her dental exams every six months. Repeat DEXA in 2 years.      Conor Cruz MD

## 2024-06-14 RX ORDER — ANASTROZOLE 1 MG/1
1 TABLET ORAL DAILY
Qty: 90 TABLET | Refills: 2 | Status: SHIPPED | OUTPATIENT
Start: 2024-06-14

## 2024-07-16 ENCOUNTER — LAB ENCOUNTER (OUTPATIENT)
Dept: LAB | Age: 79
End: 2024-07-16
Attending: INTERNAL MEDICINE
Payer: MEDICARE

## 2024-07-16 DIAGNOSIS — N18.30 STAGE 3 CHRONIC KIDNEY DISEASE, UNSPECIFIED WHETHER STAGE 3A OR 3B CKD (HCC): ICD-10-CM

## 2024-07-16 DIAGNOSIS — N18.9 VITAMIN D DEFICIENCY DUE TO CHRONIC KIDNEY DISEASE: ICD-10-CM

## 2024-07-16 DIAGNOSIS — E55.9 VITAMIN D DEFICIENCY DUE TO CHRONIC KIDNEY DISEASE: ICD-10-CM

## 2024-07-16 LAB
ANION GAP SERPL CALC-SCNC: 6 MMOL/L (ref 0–18)
BASOPHILS # BLD AUTO: 0.05 X10(3) UL (ref 0–0.2)
BASOPHILS NFR BLD AUTO: 0.8 %
BUN BLD-MCNC: 20 MG/DL (ref 9–23)
CALCIUM BLD-MCNC: 9.7 MG/DL (ref 8.7–10.4)
CHLORIDE SERPL-SCNC: 104 MMOL/L (ref 98–112)
CO2 SERPL-SCNC: 29 MMOL/L (ref 21–32)
CREAT BLD-MCNC: 1.03 MG/DL
EGFRCR SERPLBLD CKD-EPI 2021: 56 ML/MIN/1.73M2 (ref 60–?)
EOSINOPHIL # BLD AUTO: 0.43 X10(3) UL (ref 0–0.7)
EOSINOPHIL NFR BLD AUTO: 7 %
ERYTHROCYTE [DISTWIDTH] IN BLOOD BY AUTOMATED COUNT: 14 %
FASTING STATUS PATIENT QL REPORTED: YES
GLUCOSE BLD-MCNC: 112 MG/DL (ref 70–99)
HCT VFR BLD AUTO: 42.8 %
HGB BLD-MCNC: 13.7 G/DL
IMM GRANULOCYTES # BLD AUTO: 0.01 X10(3) UL (ref 0–1)
IMM GRANULOCYTES NFR BLD: 0.2 %
LYMPHOCYTES # BLD AUTO: 1.77 X10(3) UL (ref 1–4)
LYMPHOCYTES NFR BLD AUTO: 28.7 %
MAGNESIUM SERPL-MCNC: 1.8 MG/DL (ref 1.6–2.6)
MCH RBC QN AUTO: 28.5 PG (ref 26–34)
MCHC RBC AUTO-ENTMCNC: 32 G/DL (ref 31–37)
MCV RBC AUTO: 89.2 FL
MONOCYTES # BLD AUTO: 0.75 X10(3) UL (ref 0.1–1)
MONOCYTES NFR BLD AUTO: 12.2 %
NEUTROPHILS # BLD AUTO: 3.16 X10 (3) UL (ref 1.5–7.7)
NEUTROPHILS # BLD AUTO: 3.16 X10(3) UL (ref 1.5–7.7)
NEUTROPHILS NFR BLD AUTO: 51.1 %
OSMOLALITY SERPL CALC.SUM OF ELEC: 291 MOSM/KG (ref 275–295)
PHOSPHATE SERPL-MCNC: 4 MG/DL (ref 2.4–5.1)
PLATELET # BLD AUTO: 194 10(3)UL (ref 150–450)
POTASSIUM SERPL-SCNC: 4.4 MMOL/L (ref 3.5–5.1)
PTH-INTACT SERPL-MCNC: 109.1 PG/ML (ref 18.5–88)
RBC # BLD AUTO: 4.8 X10(6)UL
SODIUM SERPL-SCNC: 139 MMOL/L (ref 136–145)
VIT D+METAB SERPL-MCNC: 49.7 NG/ML (ref 30–100)
WBC # BLD AUTO: 6.2 X10(3) UL (ref 4–11)

## 2024-07-16 PROCEDURE — 36415 COLL VENOUS BLD VENIPUNCTURE: CPT

## 2024-07-16 PROCEDURE — 83735 ASSAY OF MAGNESIUM: CPT

## 2024-07-16 PROCEDURE — 80048 BASIC METABOLIC PNL TOTAL CA: CPT

## 2024-07-16 PROCEDURE — 85025 COMPLETE CBC W/AUTO DIFF WBC: CPT

## 2024-07-16 PROCEDURE — 82570 ASSAY OF URINE CREATININE: CPT

## 2024-07-16 PROCEDURE — 82306 VITAMIN D 25 HYDROXY: CPT

## 2024-07-16 PROCEDURE — 84100 ASSAY OF PHOSPHORUS: CPT

## 2024-07-16 PROCEDURE — 83970 ASSAY OF PARATHORMONE: CPT

## 2024-07-16 PROCEDURE — 84156 ASSAY OF PROTEIN URINE: CPT

## 2024-07-17 LAB
CREAT UR-SCNC: <13 MG/DL
PROT UR-MCNC: <6 MG/DL (ref ?–14)

## 2024-07-23 ENCOUNTER — OFFICE VISIT (OUTPATIENT)
Dept: NEPHROLOGY | Facility: CLINIC | Age: 79
End: 2024-07-23
Payer: MEDICARE

## 2024-07-23 VITALS — DIASTOLIC BLOOD PRESSURE: 78 MMHG | SYSTOLIC BLOOD PRESSURE: 150 MMHG | WEIGHT: 168 LBS | BODY MASS INDEX: 29 KG/M2

## 2024-07-23 DIAGNOSIS — N18.30 STAGE 3 CHRONIC KIDNEY DISEASE, UNSPECIFIED WHETHER STAGE 3A OR 3B CKD (HCC): ICD-10-CM

## 2024-07-23 PROBLEM — J44.89 ASTHMA WITH COPD (CHRONIC OBSTRUCTIVE PULMONARY DISEASE) (HCC): Chronic | Status: ACTIVE | Noted: 2024-07-23

## 2024-07-23 PROCEDURE — 99213 OFFICE O/P EST LOW 20 MIN: CPT | Performed by: INTERNAL MEDICINE

## 2024-07-23 RX ORDER — LISINOPRIL 2.5 MG/1
2.5 TABLET ORAL DAILY
Qty: 30 TABLET | Refills: 11 | Status: SHIPPED | OUTPATIENT
Start: 2024-07-23

## 2024-07-23 NOTE — PROGRESS NOTES
Nephrology Progress Note      Tiera Carlos is a 78 year old female.    HPI:   No chief complaint on file.    78 year old female with hx of breast ca, nephrolithasis, CKD - stage III here for follow up.  Patient's kidney stone was managed by urology -lithotripsy + s/p L uretral stent - removed in early January 2016.     Has some chronic LE edema which is unchanged     Denies n/v  NO cp/sob     No hematuria  NO flank pain  Denies f/c       Medications (Active prior to today's visit):  Current Outpatient Medications   Medication Sig Dispense Refill    ANASTROZOLE 1 MG Oral Tab tab TAKE 1 TABLET BY MOUTH EVERY DAY 90 tablet 2    alendronate 70 MG Oral Tab Take 1 tablet (70 mg total) by mouth every 7 days. 12 tablet 3    propranolol 10 MG Oral Tab TAKE 1 TABLET BY MOUTH 3X DAILY AS NEEDED. NOT FOR HYPERTENSION 270 tablet 1    furosemide 20 MG Oral Tab Take 1 tablet (20 mg total) by mouth daily. 90 tablet 3    lisinopril 2.5 MG Oral Tab Take 2 tablets (5 mg total) by mouth daily. (Patient not taking: Reported on 11/17/2023) 30 tablet 11    Biotin 5000 MCG Oral Tab Take by mouth.      CRANBERRY OR Take by mouth.      omeprazole 20 MG Oral Capsule Delayed Release Take 1 capsule 3 times a week. 90 capsule 3    docusate sodium 100 MG Oral Cap Take 3 capsules (300 mg total) by mouth nightly.      loratadine 10 MG Oral Tab Take 1 tablet (10 mg total) by mouth daily.      Cholecalciferol (VITAMIN D) 2000 units Oral Cap Take 1 capsule (2,000 Units total) by mouth daily.      magnesium oxide (MAG-OX) 400 MG Oral Tab Take 1 tablet (400 mg total) by mouth 2 (two) times daily. (Patient taking differently: Take 1 tablet (400 mg total) by mouth daily.) 30 tablet 0       Allergies:  Allergies   Allergen Reactions    Opioid Analgesics NAUSEA AND VOMITING     Pt reports she tolerates Norco low dose    Prednisone DIARRHEA, DIZZINESS and NAUSEA ONLY    Sulfa Antibiotics HIVES    Seasonal Runny nose         ROS:      See above;  12 systems reviewed and otherwise unremarkable     PHYSICAL EXAM:   LMP  (LMP Unknown)   Wt Readings from Last 6 Encounters:   05/17/24 172 lb (78 kg)   11/17/23 168 lb (76.2 kg)   07/18/23 170 lb (77.1 kg)   04/28/23 176 lb 12.8 oz (80.2 kg)   11/24/22 172 lb 13.5 oz (78.4 kg)   10/28/22 174 lb 6.4 oz (79.1 kg)        General: Alert and oriented in no apparent distress.  HEENT: No scleral icterus, MMM  Neck: Supple, no LLUVIA or thyromegaly  Cardiac: Regular rate and rhythm, S1, S2 normal, no murmur or rub  Lungs: Clear without wheezes, rales, rhonchi.    Abdomen: Soft, non-tender. + bowel sounds, no palpable organomegaly  Extremities: Without clubbing, cyanosis; trace edema   Neurologic: Alert and oriented, normal affect, cranial nerves grossly intact, moving all extremities  Skin: Warm and dry, no rashes      Labs reviewed     CT Abd 5/2017 - w/ bilateral non-obstructing calculi      ASSESSMENT/PLAN:     CKD- Stage III - Cr stable   - stable; continue lasix ; on  low dose acei (risk benefits reviewed)  - reviewed sglt2i use -wants to hold off   Breast Ca  -  s/p lumpectomy early Jan 2021- follows w/ oncology   Hx of kidney stones -s/p  lithotripsy -Low citrate  - encouraged hydration  + adds lemon (citric acid)  to her water  Hypokalemia/Hypomag - stable   OA   LE edema - chronic ; suspect lymphedema due to prior knee surgeries. Reviewed mechanical care options - elevate legs/stockings.   Elevated PTH; suspect related to recent bisphosphonate use- rec recheck in 1 mo. If elevated, should see endocrinology    F/U in 1 yr    Rogerio Forbes MD  7/23/2024

## 2024-09-12 RX ORDER — FUROSEMIDE 20 MG
20 TABLET ORAL DAILY
Qty: 90 TABLET | Refills: 3 | Status: SHIPPED | OUTPATIENT
Start: 2024-09-12

## 2024-11-08 ENCOUNTER — HOSPITAL ENCOUNTER (OUTPATIENT)
Dept: MAMMOGRAPHY | Age: 79
Discharge: HOME | End: 2024-11-08
Attending: INTERNAL MEDICINE
Payer: MEDICARE

## 2024-11-08 DIAGNOSIS — C50.312 MALIGNANT NEOPLASM OF LOWER-INNER QUADRANT OF LEFT BREAST IN FEMALE, ESTROGEN RECEPTOR POSITIVE (HCC): ICD-10-CM

## 2024-11-08 DIAGNOSIS — Z17.0 MALIGNANT NEOPLASM OF LOWER-INNER QUADRANT OF LEFT BREAST IN FEMALE, ESTROGEN RECEPTOR POSITIVE (HCC): ICD-10-CM

## 2024-11-08 PROCEDURE — 77066 DX MAMMO INCL CAD BI: CPT | Performed by: INTERNAL MEDICINE

## 2024-11-08 PROCEDURE — 77062 BREAST TOMOSYNTHESIS BI: CPT | Performed by: INTERNAL MEDICINE

## 2024-11-15 ENCOUNTER — OFFICE VISIT (OUTPATIENT)
Dept: HEMATOLOGY/ONCOLOGY | Age: 79
End: 2024-11-15
Attending: INTERNAL MEDICINE
Payer: MEDICARE

## 2024-11-15 VITALS
HEART RATE: 64 BPM | HEIGHT: 64.02 IN | OXYGEN SATURATION: 100 % | DIASTOLIC BLOOD PRESSURE: 70 MMHG | SYSTOLIC BLOOD PRESSURE: 138 MMHG | TEMPERATURE: 98 F | RESPIRATION RATE: 18 BRPM | BODY MASS INDEX: 29.19 KG/M2 | WEIGHT: 171 LBS

## 2024-11-15 DIAGNOSIS — C50.312 MALIGNANT NEOPLASM OF LOWER-INNER QUADRANT OF LEFT BREAST IN FEMALE, ESTROGEN RECEPTOR POSITIVE (HCC): Primary | ICD-10-CM

## 2024-11-15 DIAGNOSIS — Z17.0 MALIGNANT NEOPLASM OF LOWER-INNER QUADRANT OF LEFT BREAST IN FEMALE, ESTROGEN RECEPTOR POSITIVE (HCC): Primary | ICD-10-CM

## 2024-11-15 PROCEDURE — 99214 OFFICE O/P EST MOD 30 MIN: CPT | Performed by: INTERNAL MEDICINE

## 2024-11-15 NOTE — PROGRESS NOTES
Cancer Center Progress Note    Problem List:      Patient Active Problem List   Diagnosis    Breast cancer, right breast (HCC)    Colon polyps    Asthma (HCC)    Vitamin D deficiency    Osteoarthrosis, unspecified whether generalized or localized, lower leg    Kidney stone    Hypercalcemia    Cancer of overlapping sites of left female breast (HCC)    CKD (chronic kidney disease) stage 3, GFR 30-59 ml/min (HCC)    Gas bloat syndrome    Sciatica of right side    Primary osteoarthritis of right hip    Primary osteoarthritis of right knee/TKR sx 6-12-19/Global Exp 9-10-19/TRK/EP    History of DVT of lower extremity    Glucose intolerance    Decreased ROM of right knee    Asthma with COPD (chronic obstructive pulmonary disease) (HCC)       Interim History:    Tiera Carlos presents today for evaluation and management of a diagnosis of left breast cancer.    She is on anastrozole 1 mg daily. She is doing well on this. She has no new pain. She has no dyspnea or cough. She has no fever or sweats. Her energy level is good.    She had a lumpectomy on 1/4/2021. She had a 1.8 cm, grade I IDC. She has had a h/o left breast radiation.     She had a left breast lumpectomy for a 3 o'clock breast cancer on 5/9/2016. This showed a 0.9 cm invasive ductal carcinoma. The margins were negative. She had two negative SLN's. The ER and VT were negative (0%). The HER2 was negative. The Ki-67 was 20%. She now has completed radiation therapy on 8/18/2016. She had declined adjuvant chemotherapy.     She had a h/o right breast cancer with ER positive s/p five years of an AI.    Pathology from Lumpectomy on 1/4/2021:  Final Diagnosis:   Left breast, 8:00, lumpectomy:  -Invasive ductal carcinoma (1.8 cm), Grade 1 (Tubules: 3, Nuclear: 1, Mitoses: 1).      -The margins are negative for invasive carcinoma (more than 5 mm).  -Ductal carcinoma in situ, nuclear grade 1, solid and cribriform types.     -The margins are negative for DCIS (more  than 2 mm).  -Focal atypical ductal hyperplasia.  -Prior biopsy site changes.         Review of Systems:   The pertinent positives and negatives were described. All other systems were negative.    PMH/PSH:  Past Medical History:    Abdominal hernia    Abdominal wall hernia    Acute kidney failure, unspecified (HCC)    Anxiety state    Asthma (HCC)    Breast cancer (HCC)    right breast    Breast cancer (HCC)    left breast    Breast cancer (HCC)    left breast IDC;DCIS    Calculus of kidney    Colon polyps    Deep vein thrombosis (HCC)    1997, left leg    Diverticulosis of large intestine    Dry skin    Ductal carcinoma in situ of breast    Ductal carcinoma in situ of breast    Ductal carcinoma in situ of breast    Exposure to radiation    2006    Food intolerance    Lactose intolerant    Frequent use of laxatives    Hemorrhoids    History of blood clots    right leg    History of blood transfusion    12/2015    HYPERLIPIDEMIA    Kidney stone    Leg swelling    Osteoarthritis    OSTEOPENIA    Renal disorder    CKD    SEASONAL ALLERGIES    Shingles    SINUSITIS    Uncomfortable fullness after meals    Visual impairment    contacts and glasses    Vitamin D deficiency    Wears glasses       Past Surgical History:   Procedure Laterality Date    Bowel resection  12/2015    ostomy bag - will be reversed    Cholecystectomy      Colonoscopy  12/01/2006    POLYPS, RPT 5 YRS    Colonoscopy  09/17/2013    Jevon (rpt 3 yrs)    Colonoscopy N/A 04/26/2016    Procedure: COLONOSCOPY;  Surgeon: Randy Kowalski MD;  Location:  ENDOSCOPY    Colonoscopy N/A 06/08/2021    Procedure: COLONOSCOPY with forceps and cold snare  polypectomy;  Surgeon: Troy Cormier MD;  Location:  ENDOSCOPY    Cyst aspiration left      Cyst aspiration right      Cystoscopy,ureteroscopy,lithotripsy Left 12/12/2015    cysto, left URS, laser litho, stent placement, GAVIN, EDW    D & c      Egd  02/28/2017    [facility] Hiatal hernia    Hernia surgery  01/2017     surgical removal July, 2017    Knee replacement surgery      LEFT KNEE    Lumpectomy left  04/2016    INVASIVE DUCTAL    Lumpectomy left  01/2021    invasive ductal    Lumpectomy right  2-2006, 4-2006 02-06 re excision 04-06 rad     Jessica biopsy stereo nodule 2 site bilat (cpt=19081/85232)  2000    BENIGN    Jessica biopsy stereo nodule 2 site bilat (cpt=19081/98402)  2016    INVASIVE DUCTAL CA    Needle biopsy left Left 11/10/2020    IDC, DCIS    Other  06/02/2016    colostomy reversal    Other surgical history      LEFT FOOT NEUROMA RESECTION    Other surgical history      BREAST CANCER EXCISION W/RE-EXCISION & LUMPECTOMY [id number] LYMPH NODE BIOPSIES POSITIVE 2 OF 5 NODES APRIL 2006    Other surgical history  01/04/2021    Wire loc and left breast lumpectomy    Part removal colon w end colostomy      COLOSTOMY REVERSAL    Radiation left  2016    Radiation right  2006    Tonsillectomy      Us fna lymph node, guide incld,  left (cpt=10005)         Family History Reviewed:  Family History   Problem Relation Age of Onset    Diabetes Father         66    Hypertension Father         60    Heart Attack Father         83    Other (Other) Father     Diabetes Maternal Grandmother     Cancer Paternal Grandfather 82        COLON    Colon Cancer Paternal Grandfather         82    Diabetes Paternal Grandmother     Heart Disorder Paternal Grandmother     Breast Cancer Self 59    DCIS Self     Breast Cancer Paternal Aunt 50        estimate    Other (Other) Sister         lupus    Breast Cancer Paternal Aunt         68    Stroke Maternal Aunt         70       Allergies:     Allergies   Allergen Reactions    Opioid Analgesics NAUSEA AND VOMITING     Pt reports she tolerates Norco low dose    Prednisone DIARRHEA, DIZZINESS and NAUSEA ONLY    Sulfa Antibiotics HIVES    Seasonal Runny nose       Medications:   FUROSEMIDE 20 MG Oral Tab TAKE 1 TABLET BY MOUTH EVERY DAY 90 tablet 3    lisinopril 2.5 MG Oral Tab Take 1 tablet (2.5 mg  total) by mouth daily. 30 tablet 11    ANASTROZOLE 1 MG Oral Tab tab TAKE 1 TABLET BY MOUTH EVERY DAY 90 tablet 2    propranolol 10 MG Oral Tab TAKE 1 TABLET BY MOUTH 3X DAILY AS NEEDED. NOT FOR HYPERTENSION 270 tablet 1    Biotin 5000 MCG Oral Tab Take by mouth.      CRANBERRY OR Take by mouth.      omeprazole 20 MG Oral Capsule Delayed Release Take 1 capsule 3 times a week. 90 capsule 3    docusate sodium 100 MG Oral Cap Take 3 capsules (300 mg total) by mouth nightly.      Cholecalciferol (VITAMIN D) 2000 units Oral Cap Take 1 capsule (2,000 Units total) by mouth daily.      magnesium oxide (MAG-OX) 400 MG Oral Tab Take 1 tablet (400 mg total) by mouth 2 (two) times daily. 30 tablet 0         Vital Signs:      Height: 162.6 cm (5' 4.02\") (11/15 1024)  Weight: 77.6 kg (171 lb) (11/15 1024)  BSA (Calculated - sq m): 1.83 sq meters (11/15 1024)  Pulse: 64 (11/15 1024)  BP: 138/70 (11/15 1024)  Temp: 97.9 °F (36.6 °C) (11/15 1024)  Do Not Use - Resp Rate: --  SpO2: 100 % (11/15 1024)      Performance Status:  ECOG 0: Fully active, able to carry on all pre-disease performance without restriction    Physical Examination:    Constitutional: Patient is alert and not in acute distress.  Respiratory: Clear to auscultation and percussion. No rales.  No wheezes.  Cardiovascular: Regular rate and rhythm. No murmurs.  Breast: The right breast has no mass or skin lesion. The left breast has a palpable medial mass.  Gastrointestinal: Soft, non tender with good bowel sounds.  Musculoskeletal: No edema. No calf tenderness.  Neurological: Grossly intact without focal motor or sensory deficit.  Skin: No suspicious skin lesion, no rash, no ulceration.  Lymphatics: There is no palpable lymphadenopathy throughout in the cervical, supraclavicular, or axillary regions.  Psychiatric: The patient's mood is calm and appropriate for this visit.       Labs reviewed at this visit:     Lab Results   Component Value Date    WBC 6.2 07/16/2024     RBC 4.80 07/16/2024    HGB 13.7 07/16/2024    HCT 42.8 07/16/2024    MCV 89.2 07/16/2024    MCH 28.5 07/16/2024    MCHC 32.0 07/16/2024    RDW 14.0 07/16/2024    .0 07/16/2024     Lab Results   Component Value Date     07/16/2024    K 4.4 07/16/2024     07/16/2024    CO2 29.0 07/16/2024    BUN 20 07/16/2024    CREATSERUM 1.03 (H) 07/16/2024     (H) 07/16/2024    CA 9.7 07/16/2024    ALKPHO 65 06/18/2019    ALT 17 06/18/2019    AST 21 06/18/2019    BILT 0.89 06/18/2019    ALB 2.9 (L) 06/18/2019    TP 6.9 06/18/2019       Radiologic imaging reviewed at this visit:    Bone density dexa on 4/17/2024:  LUMBAR SPINE ANALYSIS RESULTS:      Bone mineral density (BMD) (g/cm2):  0.977    Lumbar T-Score:  -0.6      % young normals:  93      % age matched controls:  127      Change from prior spine examination:  -0.4%               TOTAL HIP ANALYSIS RESULTS:        Bone mineral density (BMD) (g/cm2):  0.694      Total Hip T-Score:  -2.0      % young normals:  74      % age matched controls:  98      Change from prior hip examination:  -11.2*%               FEMORAL NECK ANALYSIS RESULTS:        Bone mineral density (BMD) (g/cm2):  0.627      Femoral neck T-Score:  -2.0      % young normals:  74      % age matched controls:  104      Change from prior hip examination:  -10.2*%         Mammogram in 11/8/2024:  BREAST COMPOSITION:  Scattered areas fibroglandular density.     FINDINGS: There are stable benign findings which merit no further evaluation. There are no dominant masses or suspicious clusters of microcalcifications seen. There is no lymphadenopathy or nipple retraction.  Stable bilateral lumpectomy changes and post   treatment changes.  Stable large seroma central left breast over serial exams.     Impression   CONCLUSION:  Stable benign findings.     BI-RADS CATEGORY:    DIAGNOSTIC CATEGORY 2 - BENIGN FINDING:           Assessment/Plan:     Left breast cancer in the lower inner breast:  Grade  I  ER/%  Ki-67 4%  Her2 negative  Lumpectomy on 1/4/2021  1.8 cm IDC  Grade I    The patient has h/o bilateral lumpectomy and bilateral radiation. She has a low grade Stage IA breast cancer. She had lumpectomy. She has ROE on Anastrozole 1 mg daily. I will continue to see her at six month intervals. She will continue with mammogram follow up yearly.    H/O Invasive ductal carcinoma of the left breast in overlapping quadrants (3 o'clock):  ER 0%  MA 0%  Her2 not amplified  Ki-67 22%  Lumpectomy on 5/9/2016  0.9 cm cancer  0/2 SLNs  Left breast radiation completed on 8/18/2016     She has ROE of the ER negative small cancer.     Right breast cancer:     She has had ROE.  She completed five years of an aromatase inhibitor in 2011.     Chronic kidney disease:    Overall stable renal function.    Osteopenia:    She has worsening osteopenia in the hip and femur. She did not tolerate alendronate 70 mg PO weekly. I discussed option of Reclast or Prolia. I discussed the option of endocrine consultation. She declined these. Repeat DEXA in 2 years.      Conor Cruz MD

## 2025-03-03 RX ORDER — ANASTROZOLE 1 MG/1
1 TABLET ORAL DAILY
Qty: 90 TABLET | Refills: 2 | Status: SHIPPED | OUTPATIENT
Start: 2025-03-03

## 2025-05-01 ENCOUNTER — TELEPHONE (OUTPATIENT)
Age: 80
End: 2025-05-01

## 2025-05-15 ENCOUNTER — APPOINTMENT (OUTPATIENT)
Age: 80
End: 2025-05-15
Attending: INTERNAL MEDICINE
Payer: MEDICARE

## 2025-05-22 ENCOUNTER — APPOINTMENT (OUTPATIENT)
Age: 80
End: 2025-05-22
Attending: INTERNAL MEDICINE
Payer: MEDICARE

## 2025-05-29 ENCOUNTER — OFFICE VISIT (OUTPATIENT)
Age: 80
End: 2025-05-29
Attending: INTERNAL MEDICINE
Payer: MEDICARE

## 2025-05-29 VITALS
DIASTOLIC BLOOD PRESSURE: 71 MMHG | WEIGHT: 169 LBS | RESPIRATION RATE: 18 BRPM | OXYGEN SATURATION: 99 % | HEIGHT: 64.02 IN | TEMPERATURE: 97 F | HEART RATE: 84 BPM | BODY MASS INDEX: 28.85 KG/M2 | SYSTOLIC BLOOD PRESSURE: 168 MMHG

## 2025-05-29 DIAGNOSIS — Z17.0 MALIGNANT NEOPLASM OF LOWER-INNER QUADRANT OF LEFT BREAST IN FEMALE, ESTROGEN RECEPTOR POSITIVE (HCC): Primary | ICD-10-CM

## 2025-05-29 DIAGNOSIS — C50.312 MALIGNANT NEOPLASM OF LOWER-INNER QUADRANT OF LEFT BREAST IN FEMALE, ESTROGEN RECEPTOR POSITIVE (HCC): Primary | ICD-10-CM

## 2025-05-29 DIAGNOSIS — Z85.3 HISTORY OF RIGHT BREAST CANCER: ICD-10-CM

## 2025-05-29 NOTE — PROGRESS NOTES
Patient here for 6 month f/u for breast cancer. Patient taking anastrozole as directed with no c/o side effects. Patient will have breast exam today.     Education Record    Learner:  Patient    Disease / Diagnosis: breast cancer     Barriers / Limitations:  None   Comments:    Method:  Discussion   Comments:    General Topics:  Medication, Side effects and symptom management, and Plan of care reviewed   Comments:    Outcome:  Shows understanding   Comments:

## 2025-05-29 NOTE — PROGRESS NOTES
Cancer Center Progress Note    Problem List:      Problem List[1]      History of Present Illness  Tiera Carlos is a 79 year old female with a history of breast cancer who presents for follow-up.    She has a history of multiple breast cancers. In January 2021, she was diagnosed with a 1.8 cm invasive ductal carcinoma, grade 1, estrogen receptor positive in the left breast. She underwent a lumpectomy and continues to take anastrozole without side effects. Previously, she had left breast cancer in 2016, which was estrogen receptor negative, treated with lumpectomy and radiation. Additionally, she had right breast cancer, completing aromatase inhibitor treatment in 2011.    She stopped taking alendronate due to experiencing multiple side effects. Her DEXA scan from April of the previous year showed good lumbar spine density but osteopenia in the total hip and femoral neck.    She is concerned about potential future breast cancer recurrence and inquires about treatment options considering her age and previous diagnoses.    She engages in activities such as reading, walking, and visiting her daughter in [place]. She has worn contacts for 64 years and is considering cataract surgery, having never undergone the procedure before.    No new pains, breathing problems, or other concerning symptoms. Her last mammogram in November was unremarkable.      Review of Systems:   Constitutional: Negative for anorexia, fevers, chills, night sweats and weight loss.  Eyes: Negative for visual disturbance, irritation and redness.  Respiratory: Negative for cough, hemoptysis, chest pain, or dyspnea.  Cardiovascular: Negative for angina, orthopnea or palpitations.  Gastrointestinal: Negative for nausea, vomiting, change in bowel habits, diarrhea, constipation and abdominal pain.  Integument/breast: Negative for rash, skin lesions, and pruritus.  Hematologic/lymphatic: Negative for easy bruising, bleeding, and  lymphadenopathy.  Musculoskeletal: Negative for myalgias, arthralgias, muscle weakness.  Genitourinary: Negative for dysuria or hematuria  Psychiatric: The patient's mood was calm and appropriate for this visit.  The pertinent positives and negatives were described. All other systems were negative.    PMH/PSH:  Past Medical History[2]    Past Surgical History[3]    Family History Reviewed:  Family History[4]    Allergies:     Allergies[5]    Medications:  Current Medications[6]       Vital Signs:      Height: 162.6 cm (5' 4.02\") (05/29 1301)  Weight: 76.7 kg (169 lb) (05/29 1301)  BSA (Calculated - sq m): 1.82 sq meters (05/29 1301)  Pulse: 84 (05/29 1301)  BP: 168/71 (05/29 1301)  Temp: 96.9 °F (36.1 °C) (05/29 1301)  Do Not Use - Resp Rate: --  SpO2: 99 % (05/29 1301)      Performance Status:  ECOG 0: Fully active, able to carry on all pre-disease performance without restriction     Physical Examination:      Constitutional: Patient is alert and oriented x 3, not in acute distress.  Eyes: Anicteric sclera, pink conjunctiva.  HEENT:  Oropharynx is clear. Neck is supple.  Respiratory: Clear to auscultation and percussion. No rales.  No wheezes.  Cardiovascular: Regular rate and rhythm. No murmurs.  Gastrointestinal: Soft, non tender with good bowel sounds.  Musculoskeletal: No edema. No calf tenderness.  Lymphatics: There is no palpable lymphadenopathy throughout in the cervical, supraclavicular, or axillary regions.  Psychiatric: The patient's mood is calm and appropriate for this visit.         Results reviewed at this visit:     Lab Results   Component Value Date    WBC 6.2 07/16/2024    RBC 4.80 07/16/2024    HGB 13.7 07/16/2024    HCT 42.8 07/16/2024    MCV 89.2 07/16/2024    MCH 28.5 07/16/2024    MCHC 32.0 07/16/2024    RDW 14.0 07/16/2024    .0 07/16/2024     Lab Results   Component Value Date     07/16/2024    K 4.4 07/16/2024     07/16/2024    CO2 29.0 07/16/2024    BUN 20 07/16/2024     CREATSERUM 1.03 (H) 07/16/2024     (H) 07/16/2024    CA 9.7 07/16/2024    ALKPHO 65 06/18/2019    ALT 17 06/18/2019    AST 21 06/18/2019    BILT 0.89 06/18/2019    ALB 2.9 (L) 06/18/2019    TP 6.9 06/18/2019       Results  RADIOLOGY  Mammogram: Normal (11/2024)    DIAGNOSTIC  DEXA: Lumbar spine normal, total hip osteopenia, femoral neck osteopenia, 10% worsening in hip and femoral neck osteopenia (04/2024)    PATHOLOGY  Lumpectomy Pathology: 1.8 cm invasive ductal carcinoma, grade 1, estrogen receptor positive (01/2021)  Lumpectomy Pathology: Estrogen receptor negative breast cancer (05/2016)         Assessment & Plan  H/O Invasive ductal carcinoma of the left breast in overlapping quadrants (3 o'clock):  ER 0%  AK 0%  Her2 not amplified  Ki-67 22%  Lumpectomy on 5/9/2016  0.9 cm cancer  0/2 SLNs  Left breast radiation completed on 8/18/2016    Left breast cancer in the lower inner breast:  Grade I  ER/%  Ki-67 4%  Her2 negative  Lumpectomy on 1/4/2021  1.8 cm IDC  Grade I    Left breast cancer with a history of lumpectomy in January 2021 for a 1.8 cm invasive ductal carcinoma, grade 1, estrogen receptor positive. Previous left breast cancer in 2016, estrogen receptor negative, treated with lumpectomy and radiation. Currently on anastrozole since mid-2021 with good tolerance and no side effects. Discussed potential future recurrence and treatment options, including lumpectomy versus mastectomy, considering age and health status. No insurance restrictions on treatment options.  - Continue anastrozole until mid-2026 to complete 5 years of therapy.  - Order diagnostic bilateral mammogram in November 2025.    Breast cancer, right breast  Right breast cancer, completed aromatase inhibitor treatment in 2011.    Osteopenia  Osteopenia in the total hip and femoral neck, with a 10% worsening in the last DEXA scan from April 2024. She previously did not tolerate alendronate due to side effects. Current management  involves monitoring bone density with DEXA scans. Intravenous options may be considered if needed.  - Repeat DEXA scan in one year to monitor bone density changes.           The following individual(s) verbally consented to be recorded using ambient AI listening technology and understand that they can each withdraw their consent to this listening technology at any point by asking the clinician to turn off or pause the recording:    Patient name: Tiera Carlos        Conor Cruz MD          [1]   Patient Active Problem List  Diagnosis    Breast cancer, right breast (HCC)    Colon polyps    Asthma (HCC)    Vitamin D deficiency    Osteoarthrosis, unspecified whether generalized or localized, lower leg    Kidney stone    Hypercalcemia    Cancer of overlapping sites of left female breast (HCC)    CKD (chronic kidney disease) stage 3, GFR 30-59 ml/min (HCC)    Gas bloat syndrome    Sciatica of right side    Primary osteoarthritis of right hip    Primary osteoarthritis of right knee/TKR sx 6-12-19/Global Exp 9-10-19/TRK/EP    History of DVT of lower extremity    Glucose intolerance    Decreased ROM of right knee    Asthma with COPD (chronic obstructive pulmonary disease) (HCC)   [2]   Past Medical History:   Abdominal hernia    Abdominal wall hernia    Acute kidney failure, unspecified    Anxiety state    Asthma (HCC)    Breast cancer (HCC)    right breast    Breast cancer (HCC)    left breast    Breast cancer (HCC)    left breast IDC;DCIS    Calculus of kidney    Colon polyps    Deep vein thrombosis (HCC)    1997, left leg    Diverticulosis of large intestine    Dry skin    Ductal carcinoma in situ of breast    Ductal carcinoma in situ of breast    Ductal carcinoma in situ of breast    Exposure to radiation    2006    Food intolerance    Lactose intolerant    Frequent use of laxatives    Hemorrhoids    History of blood clots    right leg    History of blood transfusion    12/2015    HYPERLIPIDEMIA    Kidney  stone    Leg swelling    Osteoarthritis    OSTEOPENIA    Renal disorder    CKD    SEASONAL ALLERGIES    Shingles    SINUSITIS    Uncomfortable fullness after meals    Visual impairment    contacts and glasses    Vitamin D deficiency    Wears glasses   [3]   Past Surgical History:  Procedure Laterality Date    Bowel resection  12/2015    ostomy bag - will be reversed    Cholecystectomy      Colonoscopy  12/01/2006    POLYPS, RPT 5 YRS    Colonoscopy  09/17/2013    [place] (rpt 3 yrs)    Colonoscopy N/A 04/26/2016    Procedure: COLONOSCOPY;  Surgeon: Randy Kowalski MD;  Location:  ENDOSCOPY    Colonoscopy N/A 06/08/2021    Procedure: COLONOSCOPY with forceps and cold snare  polypectomy;  Surgeon: Troy Cormier MD;  Location:  ENDOSCOPY    Cyst aspiration left      Cyst aspiration right      Cystoscopy,ureteroscopy,lithotripsy Left 12/12/2015    cysto, left URS, laser litho, stent placement, GAVIN, EDW    D & c      Egd  02/28/2017    [place] Hiatal hernia    Hernia surgery  01/2017    surgical removal July, 2017    Knee replacement surgery      LEFT KNEE    Lumpectomy left  04/2016    INVASIVE DUCTAL    Lumpectomy left  01/2021    invasive ductal    Lumpectomy right  2-2006, 4-2006 02-06 re excision 04-06 rad     Jessica biopsy stereo nodule 2 site bilat (cpt=19081/76015)  2000    BENIGN    Jessica biopsy stereo nodule 2 site bilat (cpt=19081/91193)  2016    INVASIVE DUCTAL CA    Needle biopsy left Left 11/10/2020    IDC, DCIS    Other  06/02/2016    colostomy reversal    Other surgical history      LEFT FOOT NEUROMA RESECTION    Other surgical history      BREAST CANCER EXCISION W/RE-EXCISION & LUMPECTOMY [id number] LYMPH NODE BIOPSIES POSITIVE 2 OF 5 NODES APRIL 2006    Other surgical history  01/04/2021    Wire loc and left breast lumpectomy    Part removal colon w end colostomy      COLOSTOMY REVERSAL    Radiation left  2016    Radiation right  2006    Tonsillectomy      Us fna lymph node, guide incld,  left  (cuz=[id number])     [4]   Family History  Problem Relation Age of Onset    Diabetes Father         66    Hypertension Father         60    Heart Attack Father         83    Other (Other) Father     Diabetes Maternal Grandmother     Cancer Paternal Grandfather 82        COLON    Colon Cancer Paternal Grandfather         82    Diabetes Paternal Grandmother     Heart Disorder Paternal Grandmother     Breast Cancer Self 59    DCIS Self     Breast Cancer Paternal Aunt 50        estimate    Other (Other) Sister         lupus    Breast Cancer Paternal Aunt         68    Stroke Maternal Aunt         70   [5]   Allergies  Allergen Reactions    Opioid Analgesics NAUSEA AND VOMITING     Pt reports she tolerates Norco low dose    Prednisone DIARRHEA, DIZZINESS and NAUSEA ONLY    Sulfa Antibiotics HIVES    Seasonal Runny nose   [6]    ANASTROZOLE 1 MG Oral Tab tab TAKE 1 TABLET BY MOUTH EVERY DAY 90 tablet 2    FUROSEMIDE 20 MG Oral Tab TAKE 1 TABLET BY MOUTH EVERY DAY 90 tablet 3    lisinopril 2.5 MG Oral Tab Take 1 tablet (2.5 mg total) by mouth daily. 30 tablet 11    propranolol 10 MG Oral Tab TAKE 1 TABLET BY MOUTH 3X DAILY AS NEEDED. NOT FOR HYPERTENSION 270 tablet 1    Biotin 5000 MCG Oral Tab Take by mouth.      CRANBERRY OR Take by mouth.      omeprazole 20 MG Oral Capsule Delayed Release Take 1 capsule 3 times a week. 90 capsule 3    Cholecalciferol (VITAMIN D) 2000 units Oral Cap Take 1 capsule (2,000 Units total) by mouth in the morning.      magnesium oxide (MAG-OX) 400 MG Oral Tab Take 1 tablet (400 mg total) by mouth 2 (two) times daily. 30 tablet 0

## 2025-07-10 DIAGNOSIS — N18.30 STAGE 3 CHRONIC KIDNEY DISEASE, UNSPECIFIED WHETHER STAGE 3A OR 3B CKD (HCC): ICD-10-CM

## 2025-07-10 RX ORDER — LISINOPRIL 2.5 MG/1
2.5 TABLET ORAL DAILY
Qty: 30 TABLET | Refills: 0 | Status: SHIPPED | OUTPATIENT
Start: 2025-07-10

## 2025-07-22 ENCOUNTER — LAB ENCOUNTER (OUTPATIENT)
Dept: LAB | Age: 80
End: 2025-07-22
Attending: INTERNAL MEDICINE
Payer: MEDICARE

## 2025-07-22 DIAGNOSIS — N18.30 STAGE 3 CHRONIC KIDNEY DISEASE, UNSPECIFIED WHETHER STAGE 3A OR 3B CKD (HCC): ICD-10-CM

## 2025-07-22 LAB
ANION GAP SERPL CALC-SCNC: 10 MMOL/L (ref 0–18)
BASOPHILS # BLD AUTO: 0.03 X10(3) UL (ref 0–0.2)
BASOPHILS NFR BLD AUTO: 0.4 %
BUN BLD-MCNC: 25 MG/DL (ref 9–23)
CALCIUM BLD-MCNC: 9.6 MG/DL (ref 8.7–10.6)
CHLORIDE SERPL-SCNC: 104 MMOL/L (ref 98–112)
CO2 SERPL-SCNC: 27 MMOL/L (ref 21–32)
CREAT BLD-MCNC: 1.33 MG/DL (ref 0.55–1.02)
EGFRCR SERPLBLD CKD-EPI 2021: 41 ML/MIN/1.73M2 (ref 60–?)
EOSINOPHIL # BLD AUTO: 0.27 X10(3) UL (ref 0–0.7)
EOSINOPHIL NFR BLD AUTO: 4 %
ERYTHROCYTE [DISTWIDTH] IN BLOOD BY AUTOMATED COUNT: 13.9 %
FASTING STATUS PATIENT QL REPORTED: YES
GLUCOSE BLD-MCNC: 117 MG/DL (ref 70–99)
HCT VFR BLD AUTO: 37.7 % (ref 35–48)
HGB BLD-MCNC: 12.3 G/DL (ref 12–16)
IMM GRANULOCYTES # BLD AUTO: 0.02 X10(3) UL (ref 0–1)
IMM GRANULOCYTES NFR BLD: 0.3 %
LYMPHOCYTES # BLD AUTO: 1.75 X10(3) UL (ref 1–4)
LYMPHOCYTES NFR BLD AUTO: 26 %
MAGNESIUM SERPL-MCNC: 2.3 MG/DL (ref 1.6–2.6)
MCH RBC QN AUTO: 29.3 PG (ref 26–34)
MCHC RBC AUTO-ENTMCNC: 32.6 G/DL (ref 31–37)
MCV RBC AUTO: 89.8 FL (ref 80–100)
MONOCYTES # BLD AUTO: 0.74 X10(3) UL (ref 0.1–1)
MONOCYTES NFR BLD AUTO: 11 %
NEUTROPHILS # BLD AUTO: 3.93 X10 (3) UL (ref 1.5–7.7)
NEUTROPHILS # BLD AUTO: 3.93 X10(3) UL (ref 1.5–7.7)
NEUTROPHILS NFR BLD AUTO: 58.3 %
OSMOLALITY SERPL CALC.SUM OF ELEC: 297 MOSM/KG (ref 275–295)
PLATELET # BLD AUTO: 177 10(3)UL (ref 150–450)
POTASSIUM SERPL-SCNC: 5 MMOL/L (ref 3.5–5.1)
PTH-INTACT SERPL-MCNC: 47.5 PG/ML (ref 18.5–88)
RBC # BLD AUTO: 4.2 X10(6)UL (ref 3.8–5.3)
SODIUM SERPL-SCNC: 141 MMOL/L (ref 136–145)
VIT D+METAB SERPL-MCNC: 52.4 NG/ML (ref 30–100)
WBC # BLD AUTO: 6.7 X10(3) UL (ref 4–11)

## 2025-07-22 PROCEDURE — 83735 ASSAY OF MAGNESIUM: CPT

## 2025-07-22 PROCEDURE — 83970 ASSAY OF PARATHORMONE: CPT

## 2025-07-22 PROCEDURE — 80048 BASIC METABOLIC PNL TOTAL CA: CPT

## 2025-07-22 PROCEDURE — 36415 COLL VENOUS BLD VENIPUNCTURE: CPT

## 2025-07-22 PROCEDURE — 82306 VITAMIN D 25 HYDROXY: CPT

## 2025-07-22 PROCEDURE — 85025 COMPLETE CBC W/AUTO DIFF WBC: CPT

## 2025-07-29 ENCOUNTER — OFFICE VISIT (OUTPATIENT)
Dept: NEPHROLOGY | Facility: CLINIC | Age: 80
End: 2025-07-29
Payer: MEDICARE

## 2025-07-29 VITALS — DIASTOLIC BLOOD PRESSURE: 70 MMHG | SYSTOLIC BLOOD PRESSURE: 139 MMHG | BODY MASS INDEX: 29 KG/M2 | WEIGHT: 167.13 LBS

## 2025-07-29 DIAGNOSIS — N18.30 STAGE 3 CHRONIC KIDNEY DISEASE, UNSPECIFIED WHETHER STAGE 3A OR 3B CKD (HCC): ICD-10-CM

## 2025-07-29 DIAGNOSIS — R07.9 CHEST PAIN, UNSPECIFIED TYPE: ICD-10-CM

## 2025-07-29 PROCEDURE — 99213 OFFICE O/P EST LOW 20 MIN: CPT | Performed by: INTERNAL MEDICINE

## 2025-07-29 PROCEDURE — 1160F RVW MEDS BY RX/DR IN RCRD: CPT | Performed by: INTERNAL MEDICINE

## 2025-07-29 PROCEDURE — 1159F MED LIST DOCD IN RCRD: CPT | Performed by: INTERNAL MEDICINE

## 2025-07-29 RX ORDER — PROPRANOLOL HYDROCHLORIDE 10 MG/1
TABLET ORAL
Qty: 270 TABLET | Refills: 3 | Status: SHIPPED | OUTPATIENT
Start: 2025-07-29

## 2025-07-29 RX ORDER — LISINOPRIL 2.5 MG/1
2.5 TABLET ORAL DAILY
Qty: 90 TABLET | Refills: 3 | Status: SHIPPED | OUTPATIENT
Start: 2025-07-29

## 2025-07-29 RX ORDER — FUROSEMIDE 20 MG/1
20 TABLET ORAL DAILY
Qty: 90 TABLET | Refills: 3 | Status: SHIPPED | OUTPATIENT
Start: 2025-07-29

## (undated) DIAGNOSIS — E55.9 VITAMIN D DEFICIENCY: ICD-10-CM

## (undated) DIAGNOSIS — N18.30 STAGE 3 CHRONIC KIDNEY DISEASE, UNSPECIFIED WHETHER STAGE 3A OR 3B CKD (HCC): Primary | ICD-10-CM

## (undated) DEVICE — SUTURE PDS II 0 CT-1

## (undated) DEVICE — KENDALL SCD EXPRESS SLEEVES, KNEE LENGTH, MEDIUM: Brand: KENDALL SCD

## (undated) DEVICE — Device: Brand: PLUMEPEN

## (undated) DEVICE — #11 STERILE BLADE: Brand: POLYMER COATED BLADES

## (undated) DEVICE — BANDAGE ELASTIC ACE 6\" X-LONG

## (undated) DEVICE — TRAP 4 CPTR CHMBR N EZ INLN

## (undated) DEVICE — 450 ML BOTTLE OF 0.05% CHLORHEXIDINE GLUCONATE IN 99.95% STERILE WATER FOR IRRIGATION, USP AND APPLICATOR.: Brand: IRRISEPT ANTIMICROBIAL WOUND LAVAGE

## (undated) DEVICE — LAPAROTOMY SPONGE - RF AND X-RAY DETECTABLE PRE-WASHED: Brand: SITUATE

## (undated) DEVICE — 1200CC GUARDIAN II: Brand: GUARDIAN

## (undated) DEVICE — SUTURE VICRYL 2-0

## (undated) DEVICE — TOTAL KNEE CDS: Brand: MEDLINE INDUSTRIES, INC.

## (undated) DEVICE — ZIMMER® STERILE DISPOSABLE TOURNIQUET CUFF WITH PLC, DUAL PORT, SINGLE BLADDER, 34 IN. (86 CM)

## (undated) DEVICE — ENDOPATH ULTRA VERESS INSUFFLATION NEEDLES WITH LUER LOCK CONNECTORS: Brand: ENDOPATH

## (undated) DEVICE — Device: Brand: STABLECUT®

## (undated) DEVICE — Device: Brand: DEFENDO AIR/WATER/SUCTION AND BIOPSY VALVE

## (undated) DEVICE — BREAST-HERNIA-PORT CDS-LF: Brand: MEDLINE INDUSTRIES, INC.

## (undated) DEVICE — UNDYED BRAIDED (POLYGLACTIN 910), SYNTHETIC ABSORBABLE SUTURE: Brand: COATED VICRYL

## (undated) DEVICE — STERILE POLYISOPRENE POWDER-FREE SURGICAL GLOVES: Brand: PROTEXIS

## (undated) DEVICE — SUT VICRYL 0 UR-6 J603H

## (undated) DEVICE — DECANTER BAG 9": Brand: MEDLINE INDUSTRIES, INC.

## (undated) DEVICE — LIGHT HANDLE

## (undated) DEVICE — LAP CHOLE/APPY CDS-LF: Brand: MEDLINE INDUSTRIES, INC.

## (undated) DEVICE — CURVED, SMALL JAW, OPEN SEALER/DIVIDER: Brand: LIGASURE

## (undated) DEVICE — SIGMA LCS HIGH PERFORMANCE STERILE THREADED HEADED PINS: Brand: SIGMA LCS HIGH PERFORMANCE

## (undated) DEVICE — SOL  .9 1000ML BTL

## (undated) DEVICE — GLOVE ORTHO ALOETOUCH SZ 8-1/2

## (undated) DEVICE — SOLUTION  .9 1000ML BTL

## (undated) DEVICE — WRAP COOLING KNEE W/ICE PILLOW

## (undated) DEVICE — SNARE CAPTIFLEX MICRO-OVL OLY

## (undated) DEVICE — SUTURE ETHIBOND EXCEL 0 CT-2

## (undated) DEVICE — SLEEVE KENDALL SCD EXPRESS MED

## (undated) DEVICE — SPECIMEN CONTAINER,POSITIVE SEAL INDICATOR, OR PACKAGED: Brand: PRECISION

## (undated) DEVICE — 40580 - THE PINK PAD - ADVANCED TRENDELENBURG POSITIONING KIT: Brand: 40580 - THE PINK PAD - ADVANCED TRENDELENBURG POSITIONING KIT

## (undated) DEVICE — 3M™ TEGADERM™ TRANSPARENT FILM DRESSING, 1626W, 4 IN X 4-3/4 IN (10 CM X 12 CM), 50 EACH/CARTON, 4 CARTON/CASE: Brand: 3M™ TEGADERM™

## (undated) DEVICE — ABSORBABLE HEMOSTAT (OXIDIZED REGENERATED CELLULOSE, U.S.P.): Brand: SURGICEL

## (undated) DEVICE — 3M™ RED DOT™ MONITORING ELECTRODE WITH FOAM TAPE AND STICKY GEL, 50/BAG, 20/CASE, 72/PLT 2570: Brand: RED DOT™

## (undated) DEVICE — DRAIN CHANNEL 19FR BLAKE

## (undated) DEVICE — SUTURE VICRYL 2-0 CP-1

## (undated) DEVICE — BOWL CEMENT MIX QUICK-VAC

## (undated) DEVICE — BIPOLAR SEALER 23-112-1 AQM 6.0: Brand: AQUAMANTYS™

## (undated) DEVICE — SIGMA LCS HIGH PERFORMANCE INSTRUMENTS STERILE THREADED PINS
Type: IMPLANTABLE DEVICE | Site: KNEE
Brand: SIGMA LCS HIGH PERFORMANCE

## (undated) DEVICE — ENDOSCOPY PACK - LOWER: Brand: MEDLINE INDUSTRIES, INC.

## (undated) DEVICE — UNIVERSAL STERIBUMP® STERILE (5/CASE): Brand: UNIVERSAL STERIBUMP®

## (undated) DEVICE — GOWN SURG AERO CHROME XXL

## (undated) DEVICE — SUTURE SILK 2-0 FSL

## (undated) DEVICE — SPONGE: SPECIALTY PEANUT XR 100/CS: Brand: MEDICAL ACTION INDUSTRIES

## (undated) DEVICE — TROCAR: Brand: KII SHIELDED BLADED ACCESS SYSTEM

## (undated) DEVICE — LAPAROTOMY CDS: Brand: MEDLINE INDUSTRIES, INC.

## (undated) DEVICE — SUTURE VICRYL 3-0 SH

## (undated) DEVICE — APPLICATOR CHLORAPREP 26ML

## (undated) DEVICE — TROCAR: Brand: KII FIOS FIRST ENTRY

## (undated) DEVICE — TISSUE RETRIEVAL SYSTEM: Brand: INZII RETRIEVAL SYSTEM

## (undated) DEVICE — DISPOSABLE LAPAROSCOPIC CLIP APPLIER WITH 20 CLIPS.: Brand: EPIX® UNIVERSAL CLIP APPLIER

## (undated) DEVICE — CHLORAPREP 26ML APPLICATOR

## (undated) DEVICE — DRESSING AQUACEL AG 3.5X12

## (undated) DEVICE — CLOSURE EXOFIN 1.0ML

## (undated) DEVICE — DRAIN RELIAVAC 100CC

## (undated) DEVICE — TROCAR: Brand: KII® SLEEVE

## (undated) DEVICE — FILTERLINE NASAL ADULT O2/CO2

## (undated) DEVICE — CONVERTORS STOCKINETTE: Brand: CONVERTORS

## (undated) DEVICE — SUTURE VICRYL 1 OS-6

## (undated) DEVICE — STERILE SYNTHETIC POLYISOPRENE POWDER-FREE SURGICAL GLOVES WITH HYDROGEL COATING, SMOOTH FINISH, STRAIGHT FINGER: Brand: PROTEXIS

## (undated) DEVICE — SUTURE MONOCRYL 4-0 PS-2

## (undated) DEVICE — FORCEP RADIAL JAW 4

## (undated) DEVICE — DRAPE,U/SHT,SPLIT,FILM,60X84,STERILE: Brand: MEDLINE

## (undated) DEVICE — GAUZE SPONGES,USP TYPE VII GAUZE, 12 PLY: Brand: CURITY

## (undated) DEVICE — HOOD, PEEL-AWAY: Brand: FLYTE

## (undated) DEVICE — CONT SPEC SURG FAXITRON WEDGE

## (undated) DEVICE — 2T11 #2 PDO 36 X 36: Brand: 2T11 #2 PDO 36 X 36

## (undated) NOTE — MR AVS SNAPSHOT
[facility] [address] [place] [address], [address]  [phone]               Thank you for choosing us for your health care visit with Jennifer Baires MD.  We are glad to serve you and happy to provide you FOLLOW UP with Bridget Abrams MD   [facility] ([address] [facility])    [address] [address]            Jul 11, 2017  1:00 PM   Exam - Established Patient with Melissa Bourne MD   [facility] Dx:  CKD (chronic kidney disease) stage 3, GFR 30-59 ml/min [N18.3]           Vitamin D, 25-Hydroxy [E]    Complete by:  Jul 10, 2017 (Approximate)    Assoc Dx:  CKD (chronic kidney disease) stage 3, GFR 30-59 ml/min [N18.3]           PTH, Intact [E Lifestyle Modification Recommendations:    Modification Recommendation   Weight Reduction Maintain normal body weight (body mass index 18.5-24.9 kg/m2)   DASH eating plan Adopt a diet rich in fruits, vegetables, and low fat dairy products with reduced cont

## (undated) NOTE — Clinical Note
Toney Zimmerman has a third cancer. This cancer in the left breast is a low grade ER breast cancer. I discussed the issue of mastectomy with her. She was upset about this. She will be arrange appt with you. Your office can help her with this.  I will present a

## (undated) NOTE — LETTER
OUTSIDE TESTING RESULT REQUEST     IMPORTANT: FOR YOUR IMMEDIATE ATTENTION  Please FAX all test results listed below to: [phone]       * * * * If testing is NOT complete, arrange with patient A.S.A.P. * * * *      Patient Name: Mark Saldana  Surgery Date: 2022  CSN: [id number]  Medical Record: [id number]   : 1945 - A: 77 y      Sex: female  Surgeon(s):  Willie Aranda MD  Procedure: LAPAROSCOPIC CHOLECYSTECTOMY, POSSIBLE OPEN  Anesthesia Type: General     Surgeon: Willie Aranda MD     The following Testing and Time Line are REQUIRED PER ANESTHESIA     CBC, Platelet; NO Differential within  30 days  CMP (requires 4 hour fast) within  90 days      Thank You,   Sent by: Janel Schuster RN

## (undated) NOTE — LETTER
[facility] Testing Department  Phone: [phone]  Right Fax: [phone]  Pikk 20 Rachel Pimentel RN Date: 6/5/19    Patient Name: Pam Cola  Surgery Date: 6/12/2019    CSN: [id number]  Medical Record: [id number]

## (undated) NOTE — IP AVS SNAPSHOT
Patient Demographics     Address  [address] [address] Phone  [phone] [facility])  [phone] (Mobile) *Preferred* E-mail Address  [email or web address]      Emergency Contact(s)     Name Relation Home Work Mobile    Simone Carlos ? Do not drive until cleared by surgeon. This is usually in four to six weeks after surgery. Discuss at follow-up office visit. ? Not allowed while taking narcotic pain medication or muscle relaxants. Sex  ?  Usually allowed after four to six weeks – c Anticoagulants = blood thinners (Xarelto, Eliquis, Lovenox, Coumadin, or Aspirin)  ? Pill or shot form depending on what your physician orders. ? IF placed on Coumadin, you may also need lab work done for monitoring. ?  You will bleed easier and bruise e ? Have realistic goals and keep a positive outlook. ? Deep breathing and relaxation techniques and distractions can help! If you focus on something else, you do not experience the pain the same.  Take advantage of everything available to your to help cont surgery and that stress can affect all your other health issues (such as high blood pressure, diabetes, CHF, afib, and asthma just to name a few).   We don’t want those other health issues to cause you to get readmitted to the hospital; much better for you ? TEMPORARY HANDICAP PARKING APPLICATION  (good for 3-6 months)  – At Surgeon or PCP visit, request they fill out the form, then go to [facility] (only time you do not wait in a long line there). Some [facility] offices provide the same service.  ([facility] Take 1 tablet (325 mg total) by mouth 2 (two) times daily. NAIN Paulson         furosemide 20 MG Tabs  Commonly known as:  LASIX  Next dose due: Tonight       Take 1 tablet (20 mg total) by mouth 2 (two) times daily.    Zoila Khan MD         HYDR Linked Group #2) 06/12/19 2334 Given      [id number] HYDROcodone-acetaminophen (NORCO) 5-325 MG per tab 1 tablet 06/13/19 0629 Given      [id number] HYDROcodone-acetaminophen (NORCO) 5-325 MG per tab 1 tablet 06/13/19 1029 Given      [id number] HYDROmorphone Ordering provider:  Viviana Danielle DO  06/12/19 2305 Resulting lab:  [facility]    Specimen Information    Type Source Collected On   Blood — 06/13/19 5756          Components    Component Value Reference Range Flag Lab   Glucose 126 70 - 99 mg/dL H E Author:  Beata Olivera MD Service:  Orthopedics Author Type:  Physician    Filed:  6/11/2019  1:49 PM Date of Service:  6/11/2019  1:49 PM Status:  Signed    :  Beata Olivera MD (Physician)       [address] [place] [address] • Wears glasses 1955     Past Surgical History:   Procedure Laterality Date   • BOWEL RESECTION  December, 2015    ostomy bag - will be reversed   • BREAST SENTINEL LYMPH NODE BIOPSY Left 5/9/2016    Performed by Aisha Schilling MD at [address] [place] [address] Problem Relation Age of Onset   • Diabetes Father         77   • Hypertension Father         61   • Other (Other) Father    • Heart Attack Father         80   • Diabetes Maternal Grandmother    • Cancer Paternal Grandfather 80        COLON   • Colon Cancer Osteoarthrosis, unspecified whether generalized or localized, lower leg     Kidney stone     Hypercalcemia     Cancer of overlapping sites of left female breast (HCC)     CKD (chronic kidney disease) stage 3, GFR 30-59 ml/min (HCC)     Gas bloat syndrom traMADol HCl 50 MG Oral Tab Take 1 tablet (50 mg total) by mouth every 6 (six) hours as needed for Pain. aspirin 325 MG Oral Tab EC Take 1 tablet (325 mg total) by mouth 2 (two) times daily.          ALL:    Opioid Analgesics       NAUSEA AND VOMITING • COLONOSCOPY  12/1/2006    POLYPS, RPT 5 YRS   • COLONOSCOPY  09/17/13    Tia Jorgensen (rpt 3 yrs)   • COLONOSCOPY N/A 4/26/2016    Performed by Mila Schroeder MD at [facility] ENDOSCOPY   • CYST ASPIRATION LEFT     • CYST ASPIRATION RIGHT     • [address] • Diabetes Father         77   • Hypertension Father         61   • Other (Other) Father    • Heart Attack Father         80   • Diabetes Maternal Grandmother    • Cancer Paternal Grandfather 80        COLON   • Colon Cancer Paternal Grandfather         80 DVT prophylaxis.      CKD stage 3  - monitor, avoid nephrotoxins  - hold lasix for now    Asthma  - controlled   - cont albuterol PRN  - encourage IS[MS.1]    Elevated blood pressure  - no hx of HTN  - suspect pain related  - add hydralazine PRN for SBP > 1 after discharge from the hospital.    Important Follow up:    Jose Armando [address] [facility]  [phone]    Schedule an appointment as soon as possible for a visit in 1 week      Hoang Hawkins, [address] [place] [address] Take 100 mg by mouth every 6 (six) hours as needed for Fever. Cholecalciferol (VITAMIN D) 2000 units Oral Cap  Take 2,000 Units by mouth daily.       [facility] OR  Take 1 tablet by mouth nightly.      magnesium oxide (MAG-OX) 400 MG Oral Tab  Take 1 Problem List[SP.1]  Active Problems:    * No active hospital problems.  *[SP.2]      Past Medical History[SP.1]  Past Medical History:   Diagnosis Date   • Abdominal hernia 2017   • Abdominal pain 2015   • Abdominal wall hernia 7/24/2017   • Acute kidney fa Performed by Leatha Castro MD at [address] [place] [address], [address] Left 12/12/2015    Performed by Angella Toro MD at [facility] MAIN OR   • CYSTOSCOPY,URETEROSCOPY,LITHOTRIPSY Left 12/12/15    cysto, left URS, laser litho, stent placement, GAVIN, EDW WEIGHT BEARING RESTRICTION[SP.1]  Weight Bearing Restriction: R lower extremity        R Lower Extremity: Weight Bearing as Tolerated[SP.2]       PAIN ASSESSMENT[SP.1]  Ratin  Location: right knee  Management Techniques:  Activity promotion[SP.2]    COG Pattern: R Decreased stance time  Stoop/Curb Assistance: Not tested  Comment : above based on fim def[SP.2]    Skilled Therapy Provided:[SP.1] Pt recd in supine, spouse present.   Pt educated in role of PT, group therapy, ankle pumps, quad sets, goals for s overall the evaluation complexity is considered[SP.1] low[SP.3]. These impairments and comorbidities manifest themselves as functional limitations in independent bed mobility, transfers, and gait.   The patient is below baseline and would benefit from skil Room Number: 359/359-A  Evaluation Date: 6/12/2019  Type of Evaluation: Initial  Physician Order: PT Eval and Treat    Presenting Problem: s/p right TKA 6/12/19  Reason for Therapy: Mobility Dysfunction and Discharge Planning    History related to [facility] • COLON RESECTION LOW ANTERIOR (SIGMOID) N/A 12/19/2015    Performed by Ruperto Rubin MD at [address] [place] [address]   • COLONOSCOPY  12/1/2006    POLYPS, RPT 5 YRS   • COLONOSCOPY  09/17/13    [facility] (rpt 3 yrs)   • COLONOSCOPY N/A 4/26/2016    Performed by Ruperto Rubin, Prior Level of [place]:[SP.1] Pt ind pta, did not use assistive device, lives with supportive spouse who will be home to assist as needed.[SP.2]     SUBJECTIVE[SP.1]  \"This feels good to get up and out of that bed\"[SP.2]    Patient self-stated goal Approx Degree of Impairment: 41.77%   Standardized Score (AM-PAC Scale): 45.44   CMS Modifier (G-Code): CK    FUNCTIONAL ABILITY STATUS  Gait Assessment   Gait Assistance: Dependent assistance(actual min a)  Distance (ft): 50  Assistive Device: Rolling wal this patient is demonstrating a 41.77% degree of impairment in mobility.  Research supports that patients with this level of impairment may benefit from home with home PT.[SP.2] Based on this evaluation, patient's clinical presentation is[SP.1] stable[SP.2] Author:  Aleja Das OT Service:  Rehab Author Type:  Occupational Therapist    Filed:  6/13/2019  8:13 AM Date of Service:  6/13/2019  8:00 AM Status:  Signed    :  Aleja Das OT (Occupational Therapist)       OCCUPATIONAL THERAPY • Uncomfortable fullness after meals 2017   • Visual impairment     contacts and glasses   • Vitamin D deficiency 8/3/2011   • Wears glasses 1955       Past Surgical History  Past Surgical History:   Procedure Laterality Date   • BOWEL RESECTION  December, • RADIATION LEFT  2016   • RADIATION RIGHT  2006   • TONSILLECTOMY     • US FNA LYMPH NODE, GUIDE INCLD,  LEFT (CPT=10005)         OCCUPATIONAL PROFILE    HOME SITUATION  Type of Home: Townhouse  Home Layout: Two level  Lives With: Spouse    Toilet and Equ -   Taking care of personal grooming such as brushing teeth?: None  -   Eating meals?: None    AM-PAC Score:  Score: 21  Approx Degree of Impairment: 32.79%  Standardized Score (AM-PAC Scale): 44.27  CMS Modifier (G-Code): ALONZO    FUNCTIONAL TRANSFER ASSESSM No further OT services needed at this time.      Patient Complexity  Occupational Profile/Medical History  LOW - Brief history including review of medical or therapy records    Specific performance deficits impacting engagement in ADL/IADL  LOW  1 - 3 perfo Duoneb Unit Dose, Nebu  03/13/14     INFLUENZA 10/08/18     INFLUENZA 09/25/17     INFLUENZA 09/25/17     INFLUENZA 10/07/16     INFLUENZA 10/06/16     INFLUENZA 10/07/15     INFLUENZA 10/06/15     INFLUENZA 11/17/14     INFLUENZA 11/17/14     INFLUENZA 0 THERAPY [address]    8/5/2019 12:00 PM Sea Holland, PT OSCAR [facility] PHYSICAL THERAPY [address]    8/7/2019 12:00 PM [place] [address]    11/15/2019 11:00 AM Niranjan Cruz MD Melene Maranda

## (undated) NOTE — MR AVS SNAPSHOT
After Visit Summary   2/17/2017    Emily Jeromeelizabet    MRN: [id number]           Diagnoses this Visit     Cancer of overlapping sites of left female breast [facility])    -  Primary       Allergies     Sulfa Antibiotics Hives    Codeine Nausea and vom If you have questions regarding this exam, please contact a mammography technologist at [phone].   [employer] [employer] [address]       Tuesday May 23, 2017 10:00 AM     Appointment with Abdullahi Alvarado at [address] ([phone])   [address]

## (undated) NOTE — LETTER
OUTSIDE TESTING RESULT REQUEST     IMPORTANT: FOR YOUR IMMEDIATE ATTENTION  Please FAX all test results listed below to: [phone]       * * * * If testing is NOT complete, arrange with patient A.S.A.P. * * * *      Patient Name: Aleshia Louis  Surgery Date: 2022  CSN: [id number]  Medical Record: [id number]   : 1945 - A: 77 y      Sex: female  Surgeon(s):  Mahesh Vazquez MD  Procedure: LAPAROSCOPIC CHOLECYSTECTOMY, POSSIBLE OPEN  Anesthesia Type: General     Surgeon: Mahesh Vazquez MD     The following Testing and Time Line are REQUIRED PER ANESTHESIA     CBC, Platelet; NO Differential within  90 days  CMP (requires 4 hour fast) within  60 days (history of CKD)      Thank You,   Sent by: Dennys Hernandez RN